# Patient Record
Sex: FEMALE | Race: ASIAN | NOT HISPANIC OR LATINO
[De-identification: names, ages, dates, MRNs, and addresses within clinical notes are randomized per-mention and may not be internally consistent; named-entity substitution may affect disease eponyms.]

---

## 2017-02-13 ENCOUNTER — APPOINTMENT (OUTPATIENT)
Dept: INTERNAL MEDICINE | Facility: CLINIC | Age: 22
End: 2017-02-13

## 2023-03-13 ENCOUNTER — TRANSCRIPTION ENCOUNTER (OUTPATIENT)
Age: 28
End: 2023-03-13

## 2023-03-13 ENCOUNTER — APPOINTMENT (OUTPATIENT)
Dept: ULTRASOUND IMAGING | Facility: CLINIC | Age: 28
End: 2023-03-13
Payer: COMMERCIAL

## 2023-03-13 PROCEDURE — 76830 TRANSVAGINAL US NON-OB: CPT

## 2023-03-13 PROCEDURE — 76856 US EXAM PELVIC COMPLETE: CPT

## 2023-03-23 ENCOUNTER — APPOINTMENT (OUTPATIENT)
Dept: OBGYN | Facility: CLINIC | Age: 28
End: 2023-03-23
Payer: COMMERCIAL

## 2023-03-23 VITALS
DIASTOLIC BLOOD PRESSURE: 68 MMHG | HEIGHT: 64 IN | WEIGHT: 160 LBS | BODY MASS INDEX: 27.31 KG/M2 | SYSTOLIC BLOOD PRESSURE: 118 MMHG

## 2023-03-23 PROCEDURE — 99203 OFFICE O/P NEW LOW 30 MIN: CPT

## 2023-03-25 NOTE — PLAN
[FreeTextEntry1] : 26yo G0 presenting for consultation regarding septate uterus prior to attempting conception. Reviewed imaging and results of TVUS with patient. Discussed that a 3D TVUS or MRI is preferable to evaluate presence and extent of uterine septum. Also discussed, in detail, cause/formation of septate uteri and many people are unaware of diagnosis until after multiple miscarriages. Reviewed surgical resection procedure. Discussed that after appropriate 3D imaging, surgical treatment may be deemed appropriate to to optimize pregnancy outcome. 3D u/s ordered with patient to return to office to review images.\par During this visit 40 minutes were spent face-to-face with greater than 50% of the time dedicated to counseling.\par

## 2023-03-25 NOTE — HISTORY OF PRESENT ILLNESS
[FreeTextEntry1] : 28yo G0 LMP: 3/10/23 presenting for consultation for septate uterus. Patient states that she was diagnosed years ago with a partial septate uterus. She recently is  and interested in pursuing pregnancy. She presented to her primary GYN who followed up with a TVUS confirming a subseptate uterus (results below). She presented to the office today for consultation regarding surgical resection. \par \par GYN Hx: Menarche at 9 consisting of painful menses. Day 1 described as debilitating when younger. This past year menses has become heavier where she has to change her pads hourly for for the first few days. Menses are also irregular ranging from qmonthly to every 3-4 months. PCOS- previously on POP up until 2yrs ago (d/c 2/2 weight gain).\par PMH: Migraine with aura, ITP- dx at age 11 Olson (+), TIMMY(+); follows annually with hematology. Protein S deficiency\par PSHx: Denies\par Meds: Denies\par Allg: Clams (emesis)\par Shx: Denies T/E/D\par \par PROCEDURE DATE:  03/13/2023  \par  INTERPRETATION:  CLINICAL INFORMATION: Subseptate uterus. Polycystic \par ovary disease. Heavy menses.\par \par LMP: 03/10/2023\par \par COMPARISON: None available.\par \par TECHNIQUE:\par Endovaginal and transabdominal pelvic sonogram.\par \par FINDINGS:\par Uterus: 7.4 cm x 3.7 cm x 5.5 cm. subseptate uterus. There is an anterior \par fibroid measuring 1.3 x 1.1 x 1.5 cm.\par Endometrium: Right horn measures 4 mm. Left horn measures 5 mm.\par \par Right ovary: 1.8 cm x 4.5 cm x 3.3 cm. Within normal limits. There are \par multiple follicles.\par Left ovary: 3.8 cm x 4.4 cm x 2.4 cm. Within normal limits. There are \par multiple follicles including a dominant follicle which measures 1.6 x 1.4 \par x 2.0 cm.\par \par Fluid: None.\par \par IMPRESSION:\par Subseptate uterus. Endometrial linings are normal in thickness. Small \par anterior uterine fibroid.\par Multiple follicles in both ovaries including a dominant follicle in the \par left ovary.\par \par \par

## 2023-04-03 ENCOUNTER — RESULT REVIEW (OUTPATIENT)
Age: 28
End: 2023-04-03

## 2023-04-03 ENCOUNTER — APPOINTMENT (OUTPATIENT)
Dept: ULTRASOUND IMAGING | Facility: CLINIC | Age: 28
End: 2023-04-03

## 2023-04-07 ENCOUNTER — APPOINTMENT (OUTPATIENT)
Dept: ULTRASOUND IMAGING | Facility: HOSPITAL | Age: 28
End: 2023-04-07
Payer: COMMERCIAL

## 2023-04-07 ENCOUNTER — OUTPATIENT (OUTPATIENT)
Dept: OUTPATIENT SERVICES | Facility: HOSPITAL | Age: 28
LOS: 1 days | End: 2023-04-07
Payer: COMMERCIAL

## 2023-04-07 ENCOUNTER — RESULT REVIEW (OUTPATIENT)
Age: 28
End: 2023-04-07

## 2023-04-07 DIAGNOSIS — Q51.9 CONGENITAL MALFORMATION OF UTERUS AND CERVIX, UNSPECIFIED: ICD-10-CM

## 2023-04-07 PROCEDURE — 76377 3D RENDER W/INTRP POSTPROCES: CPT

## 2023-04-07 PROCEDURE — 76830 TRANSVAGINAL US NON-OB: CPT

## 2023-04-07 PROCEDURE — 76830 TRANSVAGINAL US NON-OB: CPT | Mod: 26

## 2023-04-07 PROCEDURE — 76377 3D RENDER W/INTRP POSTPROCES: CPT | Mod: 26

## 2023-06-30 ENCOUNTER — APPOINTMENT (OUTPATIENT)
Dept: ULTRASOUND IMAGING | Facility: CLINIC | Age: 28
End: 2023-06-30
Payer: COMMERCIAL

## 2023-06-30 PROCEDURE — 76817 TRANSVAGINAL US OBSTETRIC: CPT

## 2023-06-30 PROCEDURE — 76801 OB US < 14 WKS SINGLE FETUS: CPT

## 2023-07-07 ENCOUNTER — APPOINTMENT (OUTPATIENT)
Dept: OBGYN | Facility: CLINIC | Age: 28
End: 2023-07-07
Payer: COMMERCIAL

## 2023-07-07 VITALS — DIASTOLIC BLOOD PRESSURE: 70 MMHG | OXYGEN SATURATION: 98 % | SYSTOLIC BLOOD PRESSURE: 102 MMHG | HEART RATE: 96 BPM

## 2023-07-07 VITALS
SYSTOLIC BLOOD PRESSURE: 106 MMHG | HEIGHT: 64 IN | DIASTOLIC BLOOD PRESSURE: 78 MMHG | WEIGHT: 160 LBS | TEMPERATURE: 98.4 F | OXYGEN SATURATION: 99 % | HEART RATE: 81 BPM | BODY MASS INDEX: 27.31 KG/M2

## 2023-07-07 DIAGNOSIS — O02.1 MISSED ABORTION: ICD-10-CM

## 2023-07-07 DIAGNOSIS — Q51.9 CONGENITAL MALFORMATION OF UTERUS AND CERVIX, UNSPECIFIED: ICD-10-CM

## 2023-07-07 PROCEDURE — 59820 CARE OF MISCARRIAGE: CPT

## 2023-07-07 PROCEDURE — 76998 US GUIDE INTRAOP: CPT

## 2023-07-07 PROCEDURE — 99204 OFFICE O/P NEW MOD 45 MIN: CPT | Mod: 25

## 2023-07-07 RX ORDER — IBUPROFEN 600 MG/1
600 TABLET, FILM COATED ORAL 4 TIMES DAILY
Qty: 60 | Refills: 0 | Status: ACTIVE | COMMUNITY
Start: 2023-07-07 | End: 1900-01-01

## 2023-07-07 RX ORDER — DOXYCYCLINE HYCLATE 100 MG/1
100 TABLET ORAL
Qty: 2 | Refills: 0 | Status: ACTIVE | COMMUNITY
Start: 2023-07-07 | End: 1900-01-01

## 2023-07-07 NOTE — PHYSICAL EXAM
[Chaperone Present] : A chaperone was present in the examining room during all aspects of the physical examination [FreeTextEntry1] : Dylan Fernandez LPN
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
## 2023-07-13 LAB — CORE LAB BIOPSY: NORMAL

## 2023-07-21 ENCOUNTER — APPOINTMENT (OUTPATIENT)
Dept: OBGYN | Facility: CLINIC | Age: 28
End: 2023-07-21

## 2023-08-02 ENCOUNTER — APPOINTMENT (OUTPATIENT)
Dept: ORTHOPEDIC SURGERY | Facility: CLINIC | Age: 28
End: 2023-08-02
Payer: COMMERCIAL

## 2023-08-02 VITALS — WEIGHT: 160 LBS | BODY MASS INDEX: 27.31 KG/M2 | HEIGHT: 64 IN

## 2023-08-02 DIAGNOSIS — M65.4 RADIAL STYLOID TENOSYNOVITIS [DE QUERVAIN]: ICD-10-CM

## 2023-08-02 PROCEDURE — 73110 X-RAY EXAM OF WRIST: CPT | Mod: RT

## 2023-08-02 PROCEDURE — 20550 NJX 1 TENDON SHEATH/LIGAMENT: CPT

## 2023-08-02 PROCEDURE — 99203 OFFICE O/P NEW LOW 30 MIN: CPT | Mod: 25

## 2023-08-02 NOTE — IMAGING
[de-identified] : Right wrist No ecchymosis, swelling or wounds Normal muscle tone/ bulk TTP along first dorsal compartment Full symmetric wrist ROM Full painless finger ROM +AIN/ PIN/ UIlnar n SILT throughout fingers wwp + Finkelstein's

## 2023-08-02 NOTE — PROCEDURE
[FreeTextEntry1] : Corticosteroid injection [FreeTextEntry2] : Right de Quervain's tenosynovitis [FreeTextEntry3] : The risks and benefits of steroid injections were discussed. Verbal consent was obtained,  The site was prepped in a sterile fashion with alcohol A combination of 40 mg (1cc) of Kenalog and 2cc 1% xylocaine were injected under sterile conditions at the level of the right first dorsal compartment Patient tolerated the procedure without complication and was counseled on post injection expectations.

## 2023-08-02 NOTE — HISTORY OF PRESENT ILLNESS
[de-identified] : 08/02/2023  LATIA 28 year F is here for Location: Right Wrist  Complaint: Patient reports radial sided wrist pain beginning roughly 1 month ago.  She does have a history of similar complaints which were treated successfully with a CSI roughly 2 years ago.  At that time she noted bilateral complaints however does not notice left-sided pain today.  She notes pain associated with specific activities and motions.  No numbness or tingling. Onset of symptoms: 1 month Prior treatments: CSI, 2 years ago Hand dominance: Right  Occupation: pharmacist

## 2023-08-02 NOTE — DISCUSSION/SUMMARY
[de-identified] : - discussed etiology/ pathophysiology of her complaints today in layman's terms - reviewed treatment options, conservative and operative - reviewed r/b/a to these - activity modifications - NSAIDs prn pain - f/u in 2 weeks for repeat clinical exam

## 2023-08-03 ENCOUNTER — APPOINTMENT (OUTPATIENT)
Dept: OBGYN | Facility: CLINIC | Age: 28
End: 2023-08-03
Payer: COMMERCIAL

## 2023-08-03 PROCEDURE — ZZZZZ: CPT

## 2023-08-04 ENCOUNTER — TRANSCRIPTION ENCOUNTER (OUTPATIENT)
Age: 28
End: 2023-08-04

## 2023-08-07 LAB — HCG SERPL-MCNC: 2 MIU/ML

## 2023-08-11 ENCOUNTER — APPOINTMENT (OUTPATIENT)
Dept: HUMAN REPRODUCTION | Facility: CLINIC | Age: 28
End: 2023-08-11

## 2023-08-31 ENCOUNTER — NON-APPOINTMENT (OUTPATIENT)
Age: 28
End: 2023-08-31

## 2023-08-31 LAB — HCG SERPL-MCNC: <1 MIU/ML

## 2023-10-12 ENCOUNTER — APPOINTMENT (OUTPATIENT)
Dept: INTERNAL MEDICINE | Facility: CLINIC | Age: 28
End: 2023-10-12

## 2023-11-10 ENCOUNTER — NON-APPOINTMENT (OUTPATIENT)
Age: 28
End: 2023-11-10

## 2023-11-10 ENCOUNTER — APPOINTMENT (OUTPATIENT)
Dept: INTERNAL MEDICINE | Facility: CLINIC | Age: 28
End: 2023-11-10
Payer: COMMERCIAL

## 2023-11-10 VITALS
OXYGEN SATURATION: 98 % | BODY MASS INDEX: 29.38 KG/M2 | HEIGHT: 63.78 IN | WEIGHT: 170 LBS | SYSTOLIC BLOOD PRESSURE: 117 MMHG | HEART RATE: 78 BPM | DIASTOLIC BLOOD PRESSURE: 78 MMHG | TEMPERATURE: 98.1 F

## 2023-11-10 DIAGNOSIS — Z23 ENCOUNTER FOR IMMUNIZATION: ICD-10-CM

## 2023-11-10 DIAGNOSIS — Z83.3 FAMILY HISTORY OF DIABETES MELLITUS: ICD-10-CM

## 2023-11-10 DIAGNOSIS — Z01.84 ENCOUNTER FOR ANTIBODY RESPONSE EXAMINATION: ICD-10-CM

## 2023-11-10 DIAGNOSIS — Z00.00 ENCOUNTER FOR GENERAL ADULT MEDICAL EXAMINATION W/OUT ABNORMAL FINDINGS: ICD-10-CM

## 2023-11-10 DIAGNOSIS — Z82.49 FAMILY HISTORY OF ISCHEMIC HEART DISEASE AND OTHER DISEASES OF THE CIRCULATORY SYSTEM: ICD-10-CM

## 2023-11-10 DIAGNOSIS — R00.2 PALPITATIONS: ICD-10-CM

## 2023-11-10 PROCEDURE — G0444 DEPRESSION SCREEN ANNUAL: CPT | Mod: 59

## 2023-11-10 PROCEDURE — 90471 IMMUNIZATION ADMIN: CPT

## 2023-11-10 PROCEDURE — 99203 OFFICE O/P NEW LOW 30 MIN: CPT | Mod: 25

## 2023-11-10 PROCEDURE — 90715 TDAP VACCINE 7 YRS/> IM: CPT

## 2023-11-10 PROCEDURE — 99385 PREV VISIT NEW AGE 18-39: CPT | Mod: 25

## 2023-11-10 PROCEDURE — 93000 ELECTROCARDIOGRAM COMPLETE: CPT | Mod: 59

## 2023-11-10 PROCEDURE — 36415 COLL VENOUS BLD VENIPUNCTURE: CPT

## 2023-11-11 PROBLEM — Z23 ENCOUNTER FOR IMMUNIZATION: Status: ACTIVE | Noted: 2023-11-10 | Resolved: 2023-11-24

## 2023-11-11 PROBLEM — R00.2 PALPITATIONS: Status: ACTIVE | Noted: 2023-11-11

## 2023-11-11 PROBLEM — Z01.84 ANTIBODY RESPONSE EXAMINATION: Status: ACTIVE | Noted: 2023-11-10

## 2023-11-12 LAB
25(OH)D3 SERPL-MCNC: 30.9 NG/ML
ALBUMIN SERPL ELPH-MCNC: 4.5 G/DL
ALP BLD-CCNC: 71 U/L
ALT SERPL-CCNC: 18 U/L
ANION GAP SERPL CALC-SCNC: 14 MMOL/L
APPEARANCE: CLEAR
AST SERPL-CCNC: 15 U/L
BASOPHILS # BLD AUTO: 0.03 K/UL
BASOPHILS NFR BLD AUTO: 0.6 %
BILIRUB SERPL-MCNC: 0.6 MG/DL
BILIRUBIN URINE: NEGATIVE
BLOOD URINE: NEGATIVE
BUN SERPL-MCNC: 15 MG/DL
CALCIUM SERPL-MCNC: 9.4 MG/DL
CHLORIDE SERPL-SCNC: 104 MMOL/L
CO2 SERPL-SCNC: 21 MMOL/L
COLOR: YELLOW
CREAT SERPL-MCNC: 0.78 MG/DL
EGFR: 106 ML/MIN/1.73M2
EOSINOPHIL # BLD AUTO: 0.15 K/UL
EOSINOPHIL NFR BLD AUTO: 3.1 %
ESTIMATED AVERAGE GLUCOSE: 108 MG/DL
GLUCOSE QUALITATIVE U: NEGATIVE MG/DL
GLUCOSE SERPL-MCNC: 101 MG/DL
HBA1C MFR BLD HPLC: 5.4 %
HCT VFR BLD CALC: 40.3 %
HGB BLD-MCNC: 13.4 G/DL
IMM GRANULOCYTES NFR BLD AUTO: 0.4 %
KETONES URINE: NEGATIVE MG/DL
LEUKOCYTE ESTERASE URINE: NEGATIVE
LYMPHOCYTES # BLD AUTO: 1.35 K/UL
LYMPHOCYTES NFR BLD AUTO: 28.2 %
MAN DIFF?: NORMAL
MCHC RBC-ENTMCNC: 30.5 PG
MCHC RBC-ENTMCNC: 33.3 GM/DL
MCV RBC AUTO: 91.6 FL
MEV IGG FLD QL IA: >300 AU/ML
MEV IGG+IGM SER-IMP: POSITIVE
MONOCYTES # BLD AUTO: 0.4 K/UL
MONOCYTES NFR BLD AUTO: 8.4 %
MUV AB SER-ACNC: POSITIVE
MUV IGG SER QL IA: 58.1 AU/ML
NEUTROPHILS # BLD AUTO: 2.83 K/UL
NEUTROPHILS NFR BLD AUTO: 59.3 %
NITRITE URINE: NEGATIVE
PH URINE: 5.5
PLATELET # BLD AUTO: 247 K/UL
POTASSIUM SERPL-SCNC: 4.2 MMOL/L
PROT SERPL-MCNC: 7.5 G/DL
PROTEIN URINE: NEGATIVE MG/DL
RBC # BLD: 4.4 M/UL
RBC # FLD: 12.4 %
RHEUMATOID FACT SER QL: <10 IU/ML
RUBV IGG FLD-ACNC: 2.8 INDEX
RUBV IGG SER-IMP: POSITIVE
SODIUM SERPL-SCNC: 139 MMOL/L
SPECIFIC GRAVITY URINE: 1.02
TSH SERPL-ACNC: 1.62 UIU/ML
UROBILINOGEN URINE: 0.2 MG/DL
WBC # FLD AUTO: 4.78 K/UL

## 2023-11-13 ENCOUNTER — TRANSCRIPTION ENCOUNTER (OUTPATIENT)
Age: 28
End: 2023-11-13

## 2023-11-13 LAB
CHOLEST SERPL-MCNC: 120 MG/DL
HDLC SERPL-MCNC: 48 MG/DL
LDLC SERPL CALC-MCNC: 62 MG/DL
NONHDLC SERPL-MCNC: 73 MG/DL
TRIGL SERPL-MCNC: 43 MG/DL

## 2023-11-14 ENCOUNTER — TRANSCRIPTION ENCOUNTER (OUTPATIENT)
Age: 28
End: 2023-11-14

## 2023-11-14 LAB
ANA PAT FLD IF-IMP: ABNORMAL
ANA SER IF-ACNC: ABNORMAL
CCP AB SER IA-ACNC: <8 UNITS
RF+CCP IGG SER-IMP: NEGATIVE

## 2024-03-08 ENCOUNTER — ASOB RESULT (OUTPATIENT)
Age: 29
End: 2024-03-08

## 2024-03-08 ENCOUNTER — APPOINTMENT (OUTPATIENT)
Dept: ANTEPARTUM | Facility: CLINIC | Age: 29
End: 2024-03-08
Payer: COMMERCIAL

## 2024-03-08 PROCEDURE — 76813 OB US NUCHAL MEAS 1 GEST: CPT | Mod: 59

## 2024-03-08 PROCEDURE — 76801 OB US < 14 WKS SINGLE FETUS: CPT

## 2024-03-11 DIAGNOSIS — O09.90 SUPERVISION OF HIGH RISK PREGNANCY, UNSPECIFIED, UNSPECIFIED TRIMESTER: ICD-10-CM

## 2024-03-14 ENCOUNTER — APPOINTMENT (OUTPATIENT)
Dept: ANTEPARTUM | Facility: CLINIC | Age: 29
End: 2024-03-14
Payer: COMMERCIAL

## 2024-03-14 ENCOUNTER — ASOB RESULT (OUTPATIENT)
Age: 29
End: 2024-03-14

## 2024-03-14 PROCEDURE — 99202 OFFICE O/P NEW SF 15 MIN: CPT | Mod: 25

## 2024-03-14 PROCEDURE — 76817 TRANSVAGINAL US OBSTETRIC: CPT | Mod: 59

## 2024-03-14 PROCEDURE — 76801 OB US < 14 WKS SINGLE FETUS: CPT

## 2024-03-15 ENCOUNTER — OUTPATIENT (OUTPATIENT)
Dept: OUTPATIENT SERVICES | Facility: HOSPITAL | Age: 29
LOS: 1 days | End: 2024-03-15
Payer: COMMERCIAL

## 2024-03-15 ENCOUNTER — LABORATORY RESULT (OUTPATIENT)
Age: 29
End: 2024-03-15

## 2024-03-15 DIAGNOSIS — Z34.00 ENCOUNTER FOR SUPERVISION OF NORMAL FIRST PREGNANCY, UNSPECIFIED TRIMESTER: ICD-10-CM

## 2024-03-15 PROCEDURE — 86850 RBC ANTIBODY SCREEN: CPT

## 2024-03-15 PROCEDURE — 86870 RBC ANTIBODY IDENTIFICATION: CPT

## 2024-03-15 PROCEDURE — 86880 COOMBS TEST DIRECT: CPT

## 2024-03-15 PROCEDURE — 86901 BLOOD TYPING SEROLOGIC RH(D): CPT

## 2024-03-15 PROCEDURE — 86900 BLOOD TYPING SEROLOGIC ABO: CPT

## 2024-03-15 PROCEDURE — 86886 COOMBS TEST INDIRECT TITER: CPT

## 2024-03-16 PROCEDURE — 86077 PHYS BLOOD BANK SERV XMATCH: CPT

## 2024-03-27 ENCOUNTER — ASOB RESULT (OUTPATIENT)
Age: 29
End: 2024-03-27

## 2024-03-27 ENCOUNTER — APPOINTMENT (OUTPATIENT)
Dept: MATERNAL FETAL MEDICINE | Facility: CLINIC | Age: 29
End: 2024-03-27
Payer: COMMERCIAL

## 2024-03-27 PROCEDURE — 99214 OFFICE O/P EST MOD 30 MIN: CPT | Mod: 95

## 2024-04-01 ENCOUNTER — LABORATORY RESULT (OUTPATIENT)
Age: 29
End: 2024-04-01

## 2024-04-01 ENCOUNTER — ASOB RESULT (OUTPATIENT)
Age: 29
End: 2024-04-01

## 2024-04-01 ENCOUNTER — APPOINTMENT (OUTPATIENT)
Dept: ANTEPARTUM | Facility: CLINIC | Age: 29
End: 2024-04-01
Payer: COMMERCIAL

## 2024-04-01 PROCEDURE — 76805 OB US >/= 14 WKS SNGL FETUS: CPT

## 2024-04-01 PROCEDURE — 36415 COLL VENOUS BLD VENIPUNCTURE: CPT

## 2024-04-01 PROCEDURE — 76817 TRANSVAGINAL US OBSTETRIC: CPT

## 2024-04-05 ENCOUNTER — APPOINTMENT (OUTPATIENT)
Dept: RHEUMATOLOGY | Facility: CLINIC | Age: 29
End: 2024-04-05

## 2024-04-15 ENCOUNTER — ASOB RESULT (OUTPATIENT)
Age: 29
End: 2024-04-15

## 2024-04-15 ENCOUNTER — APPOINTMENT (OUTPATIENT)
Dept: ANTEPARTUM | Facility: CLINIC | Age: 29
End: 2024-04-15
Payer: COMMERCIAL

## 2024-04-15 ENCOUNTER — LABORATORY RESULT (OUTPATIENT)
Age: 29
End: 2024-04-15

## 2024-04-15 PROCEDURE — 76816 OB US FOLLOW-UP PER FETUS: CPT

## 2024-04-15 PROCEDURE — 99213 OFFICE O/P EST LOW 20 MIN: CPT | Mod: 25

## 2024-04-15 PROCEDURE — 76817 TRANSVAGINAL US OBSTETRIC: CPT

## 2024-04-18 ENCOUNTER — APPOINTMENT (OUTPATIENT)
Dept: RHEUMATOLOGY | Facility: CLINIC | Age: 29
End: 2024-04-18
Payer: COMMERCIAL

## 2024-04-18 VITALS
BODY MASS INDEX: 29.71 KG/M2 | SYSTOLIC BLOOD PRESSURE: 114 MMHG | OXYGEN SATURATION: 98 % | WEIGHT: 174 LBS | DIASTOLIC BLOOD PRESSURE: 75 MMHG | RESPIRATION RATE: 16 BRPM | HEIGHT: 64 IN | HEART RATE: 102 BPM

## 2024-04-18 DIAGNOSIS — M25.50 PAIN IN UNSPECIFIED JOINT: ICD-10-CM

## 2024-04-18 DIAGNOSIS — R76.8 OTHER SPECIFIED ABNORMAL IMMUNOLOGICAL FINDINGS IN SERUM: ICD-10-CM

## 2024-04-18 LAB
ALBUMIN SERPL ELPH-MCNC: 4 G/DL
ALP BLD-CCNC: 55 U/L
ALT SERPL-CCNC: 19 U/L
ANION GAP SERPL CALC-SCNC: 11 MMOL/L
AST SERPL-CCNC: 17 U/L
BASOPHILS # BLD AUTO: 0.02 K/UL
BASOPHILS NFR BLD AUTO: 0.3 %
BILIRUB SERPL-MCNC: 0.6 MG/DL
BUN SERPL-MCNC: 8 MG/DL
CALCIUM SERPL-MCNC: 9 MG/DL
CHLORIDE SERPL-SCNC: 107 MMOL/L
CK SERPL-CCNC: 41 U/L
CO2 SERPL-SCNC: 21 MMOL/L
CREAT SERPL-MCNC: 0.51 MG/DL
EGFR: 130 ML/MIN/1.73M2
EOSINOPHIL # BLD AUTO: 0.06 K/UL
EOSINOPHIL NFR BLD AUTO: 0.8 %
HCT VFR BLD CALC: 35.4 %
HGB BLD-MCNC: 11.7 G/DL
IMM GRANULOCYTES NFR BLD AUTO: 1.1 %
LYMPHOCYTES # BLD AUTO: 1.17 K/UL
LYMPHOCYTES NFR BLD AUTO: 14.9 %
MAN DIFF?: NORMAL
MCHC RBC-ENTMCNC: 30.5 PG
MCHC RBC-ENTMCNC: 33.1 GM/DL
MCV RBC AUTO: 92.4 FL
MONOCYTES # BLD AUTO: 0.5 K/UL
MONOCYTES NFR BLD AUTO: 6.4 %
NEUTROPHILS # BLD AUTO: 6.02 K/UL
NEUTROPHILS NFR BLD AUTO: 76.5 %
PLATELET # BLD AUTO: 199 K/UL
POTASSIUM SERPL-SCNC: 3.7 MMOL/L
PROT SERPL-MCNC: 6.5 G/DL
RBC # BLD: 3.83 M/UL
RBC # FLD: 13.8 %
RHEUMATOID FACT SER QL: <10 IU/ML
SODIUM SERPL-SCNC: 139 MMOL/L
WBC # FLD AUTO: 7.86 K/UL

## 2024-04-18 PROCEDURE — 99204 OFFICE O/P NEW MOD 45 MIN: CPT

## 2024-04-18 NOTE — ASSESSMENT
[FreeTextEntry1] : 1.  TIMMY positive: no symptoms to suggest an inflammatory disorder, although she did have ITP during childhood 2.  Childhood ITP: resolved after treatment with WinRho 3.  Wrist pain: s/p injections in 2023 and 2024 (was this a wrist joint problem or perhaps deQuervain's?) 4.  OB history:  currently 18 weeks pregnant with bleeding as a complication.  Plan Lab tests Reviewed internal and/or external records of other providers Contact me

## 2024-04-18 NOTE — HISTORY OF PRESENT ILLNESS
[FreeTextEntry1] : 1.  TIMMY positive: no symptoms to suggest an inflammatory disorder, although she did have ITP during childhood 2.  Childhood ITP: resolved after treatment with WinRho 3.  Wrist pain: s/p injections in 2023 and 2024 (was this a wrist joint problem or perhaps deQuervain's?) 4.  OB history:  currently 18 weeks pregnant with bleeding as a complication.  Meds vitamins

## 2024-04-18 NOTE — REASON FOR VISIT
[Consultation] : a consultation visit [FreeTextEntry1] : ITMMY positive;  Katie Adorno MD  [address], [place] [address];  Lily Suarez MD [address], [place] [address]

## 2024-04-18 NOTE — PHYSICAL EXAM
[General Appearance - Alert] : alert [General Appearance - In No Acute Distress] : in no acute distress [General Appearance - Well Nourished] : well nourished [General Appearance - Well-Appearing] : healthy appearing [Sclera] : the sclera and conjunctiva were normal [Outer Ear] : the ears and nose were normal in appearance [Neck Appearance] : the appearance of the neck was normal [Auscultation Breath Sounds / Voice Sounds] : lungs were clear to auscultation bilaterally [Heart Rate And Rhythm] : heart rate was normal and rhythm regular [Heart Sounds] : normal S1 and S2 [Heart Sounds Gallop] : no gallops [Murmurs] : no murmurs [Edema] : there was no peripheral edema [Abdomen Soft] : soft [Cervical Lymph Nodes Enlarged Posterior Bilaterally] : posterior cervical [Cervical Lymph Nodes Enlarged Anterior Bilaterally] : anterior cervical [Supraclavicular Lymph Nodes Enlarged Bilaterally] : supraclavicular [No CVA Tenderness] : no ~M costovertebral angle tenderness [Abnormal Walk] : normal gait [Nail Clubbing] : no clubbing  or cyanosis of the fingernails [Musculoskeletal - Swelling] : no joint swelling seen [Motor Tone] : muscle strength and tone were normal [Skin Color & Pigmentation] : normal skin color and pigmentation [Skin Turgor] : normal skin turgor [] : no rash [Sensation] : the sensory exam was normal to light touch and pinprick [Oriented To Time, Place, And Person] : oriented to person, place, and time [Impaired Insight] : insight and judgment were intact [Affect] : the affect was normal

## 2024-04-19 LAB
C3 SERPL-MCNC: 135 MG/DL
C4 SERPL-MCNC: 27 MG/DL
DSDNA AB SER-ACNC: 1 IU/ML
ENA RNP AB SER IA-ACNC: 0.2 AL
ENA SM AB SER IA-ACNC: <0.2 AL

## 2024-04-20 LAB
CCP AB SER IA-ACNC: <8 UNITS
RF+CCP IGG SER-IMP: NEGATIVE
THYROGLOB AB SERPL-ACNC: <20 IU/ML
THYROPEROXIDASE AB SERPL IA-ACNC: <10 IU/ML

## 2024-04-21 LAB — ANA SER IF-ACNC: NEGATIVE

## 2024-04-23 LAB
A PHAGOCYTOPH IGG TITR SER IF: NORMAL
B BURGDOR AB SER QL IA: 0.1 IV
B MICROTI IGG TITR SER: NORMAL
E CHAFFEENSIS IGG TITR SER IF: NORMAL

## 2024-04-24 LAB
PS-PROTHROM CMPLX IGG SERPL IA-ACNC: <10 UNITS
PS-PROTHROM CMPLX IGM SERPL IA-ACNC: 14 UNITS

## 2024-05-03 ENCOUNTER — APPOINTMENT (OUTPATIENT)
Dept: ANTEPARTUM | Facility: CLINIC | Age: 29
End: 2024-05-03
Payer: COMMERCIAL

## 2024-05-03 ENCOUNTER — ASOB RESULT (OUTPATIENT)
Age: 29
End: 2024-05-03

## 2024-05-03 LAB
ALBUMIN SERPL ELPH-MCNC: 4 G/DL
ALP BLD-CCNC: 66 U/L
ALT SERPL-CCNC: 20 U/L
ANION GAP SERPL CALC-SCNC: 12 MMOL/L
AST SERPL-CCNC: 19 U/L
BILIRUB SERPL-MCNC: 0.6 MG/DL
BUN SERPL-MCNC: 7 MG/DL
CALCIUM SERPL-MCNC: 9.1 MG/DL
CHLORIDE SERPL-SCNC: 106 MMOL/L
CO2 SERPL-SCNC: 21 MMOL/L
CREAT SERPL-MCNC: 0.53 MG/DL
EGFR: 128 ML/MIN/1.73M2
GLUCOSE SERPL-MCNC: 76 MG/DL
POTASSIUM SERPL-SCNC: 4.3 MMOL/L
PROT SERPL-MCNC: 6.8 G/DL
SODIUM SERPL-SCNC: 139 MMOL/L

## 2024-05-03 PROCEDURE — 76811 OB US DETAILED SNGL FETUS: CPT

## 2024-05-03 PROCEDURE — 76817 TRANSVAGINAL US OBSTETRIC: CPT | Mod: 59

## 2024-05-13 ENCOUNTER — ASOB RESULT (OUTPATIENT)
Age: 29
End: 2024-05-13

## 2024-05-13 ENCOUNTER — APPOINTMENT (OUTPATIENT)
Dept: ANTEPARTUM | Facility: CLINIC | Age: 29
End: 2024-05-13
Payer: COMMERCIAL

## 2024-05-13 PROCEDURE — 76817 TRANSVAGINAL US OBSTETRIC: CPT

## 2024-05-29 ENCOUNTER — APPOINTMENT (OUTPATIENT)
Dept: ANTEPARTUM | Facility: CLINIC | Age: 29
End: 2024-05-29
Payer: COMMERCIAL

## 2024-05-29 ENCOUNTER — ASOB RESULT (OUTPATIENT)
Age: 29
End: 2024-05-29

## 2024-05-29 PROCEDURE — 76816 OB US FOLLOW-UP PER FETUS: CPT

## 2024-06-24 ENCOUNTER — APPOINTMENT (OUTPATIENT)
Dept: ANTEPARTUM | Facility: CLINIC | Age: 29
End: 2024-06-24

## 2024-06-24 ENCOUNTER — ASOB RESULT (OUTPATIENT)
Age: 29
End: 2024-06-24

## 2024-06-24 PROCEDURE — 76816 OB US FOLLOW-UP PER FETUS: CPT

## 2024-06-28 ENCOUNTER — ASOB RESULT (OUTPATIENT)
Age: 29
End: 2024-06-28

## 2024-06-28 ENCOUNTER — APPOINTMENT (OUTPATIENT)
Dept: MATERNAL FETAL MEDICINE | Facility: CLINIC | Age: 29
End: 2024-06-28
Payer: COMMERCIAL

## 2024-06-28 DIAGNOSIS — O99.810 ABNORMAL GLUCOSE COMPLICATING PREGNANCY: ICD-10-CM

## 2024-06-28 PROCEDURE — G0109 DIAB MANAGE TRN IND/GROUP: CPT | Mod: 95

## 2024-07-10 ENCOUNTER — NON-APPOINTMENT (OUTPATIENT)
Age: 29
End: 2024-07-10

## 2024-07-12 ENCOUNTER — ASOB RESULT (OUTPATIENT)
Age: 29
End: 2024-07-12

## 2024-07-12 ENCOUNTER — APPOINTMENT (OUTPATIENT)
Dept: MATERNAL FETAL MEDICINE | Facility: CLINIC | Age: 29
End: 2024-07-12
Payer: COMMERCIAL

## 2024-07-12 PROCEDURE — G0108 DIAB MANAGE TRN  PER INDIV: CPT | Mod: 95

## 2024-07-22 ENCOUNTER — ASOB RESULT (OUTPATIENT)
Age: 29
End: 2024-07-22

## 2024-07-22 ENCOUNTER — APPOINTMENT (OUTPATIENT)
Dept: ANTEPARTUM | Facility: CLINIC | Age: 29
End: 2024-07-22
Payer: COMMERCIAL

## 2024-07-22 PROCEDURE — 76816 OB US FOLLOW-UP PER FETUS: CPT

## 2024-07-29 ENCOUNTER — APPOINTMENT (OUTPATIENT)
Dept: MATERNAL FETAL MEDICINE | Facility: CLINIC | Age: 29
End: 2024-07-29
Payer: COMMERCIAL

## 2024-07-29 ENCOUNTER — ASOB RESULT (OUTPATIENT)
Age: 29
End: 2024-07-29

## 2024-07-29 PROCEDURE — G0108 DIAB MANAGE TRN  PER INDIV: CPT | Mod: 95

## 2024-08-01 ENCOUNTER — OUTPATIENT (OUTPATIENT)
Dept: OUTPATIENT SERVICES | Facility: HOSPITAL | Age: 29
LOS: 1 days | End: 2024-08-01
Payer: COMMERCIAL

## 2024-08-01 VITALS — OXYGEN SATURATION: 97 % | HEART RATE: 98 BPM

## 2024-08-01 VITALS — TEMPERATURE: 99 F

## 2024-08-01 DIAGNOSIS — Z98.890 OTHER SPECIFIED POSTPROCEDURAL STATES: Chronic | ICD-10-CM

## 2024-08-01 DIAGNOSIS — O26.899 OTHER SPECIFIED PREGNANCY RELATED CONDITIONS, UNSPECIFIED TRIMESTER: ICD-10-CM

## 2024-08-01 PROCEDURE — G0463: CPT

## 2024-08-01 NOTE — OB RN TRIAGE NOTE - NSDCTEMPFAHRENHEIT_OBGYN_A_OB_CAL
- Patient with debilitating b/l UE and LE tremor, followed by neurology outpatient. Suspected medication-induced PD, however tremor worsened after weaning off abilify. Was considering sinemet outpatient.   - Patient also with reported incidents of episodic confusion/hallucinations. MRI without infarcts.    Plan  - Neurology consulted, apprec recs  - Ammonia level (wnl); thiamine level (ordered 5/8, results pending)  - Patient with prior low levels of thiamine, not taking supplements at home  - Start IV thiamine 500mg TID x 5 days (end date: 5/13), then transition to po pending result of thiamine level  - No sinamet at this time, as has potential to worsen hallucinations and current abdominal pain  - 24 hr EEG complete without demonstration of epileptiform activity  - delirium precautions; minimize sedating medications    98.8
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
## 2024-08-01 NOTE — OB PROVIDER TRIAGE NOTE - NSHPPHYSICALEXAM_GEN_ALL_CORE
Gen: NAD  Cards: Extremities well perfused  Pulm: Breathing comfortably on RA  Abd: soft, non-tender, gravid  Ext: warm, well perfused, non-edematous     NST: 135/moderate variability/+Accels/-decels

## 2024-08-03 ENCOUNTER — OUTPATIENT (OUTPATIENT)
Dept: OUTPATIENT SERVICES | Facility: HOSPITAL | Age: 29
LOS: 1 days | End: 2024-08-03
Payer: COMMERCIAL

## 2024-08-03 VITALS
HEART RATE: 96 BPM | RESPIRATION RATE: 18 BRPM | TEMPERATURE: 99 F | DIASTOLIC BLOOD PRESSURE: 79 MMHG | OXYGEN SATURATION: 95 % | SYSTOLIC BLOOD PRESSURE: 117 MMHG

## 2024-08-03 VITALS — DIASTOLIC BLOOD PRESSURE: 73 MMHG | HEART RATE: 91 BPM | SYSTOLIC BLOOD PRESSURE: 139 MMHG

## 2024-08-03 DIAGNOSIS — O26.899 OTHER SPECIFIED PREGNANCY RELATED CONDITIONS, UNSPECIFIED TRIMESTER: ICD-10-CM

## 2024-08-03 DIAGNOSIS — Z98.890 OTHER SPECIFIED POSTPROCEDURAL STATES: Chronic | ICD-10-CM

## 2024-08-03 PROBLEM — Z78.9 OTHER SPECIFIED HEALTH STATUS: Chronic | Status: ACTIVE | Noted: 2024-08-01

## 2024-08-03 LAB
APPEARANCE UR: CLEAR — SIGNIFICANT CHANGE UP
BILIRUB UR-MCNC: NEGATIVE — SIGNIFICANT CHANGE UP
COLOR SPEC: YELLOW — SIGNIFICANT CHANGE UP
DIFF PNL FLD: NEGATIVE — SIGNIFICANT CHANGE UP
GLUCOSE UR QL: NEGATIVE MG/DL — SIGNIFICANT CHANGE UP
KETONES UR-MCNC: NEGATIVE MG/DL — SIGNIFICANT CHANGE UP
LEUKOCYTE ESTERASE UR-ACNC: NEGATIVE — SIGNIFICANT CHANGE UP
NITRITE UR-MCNC: NEGATIVE — SIGNIFICANT CHANGE UP
PH UR: 6 — SIGNIFICANT CHANGE UP (ref 5–8)
PROT UR-MCNC: SIGNIFICANT CHANGE UP MG/DL
SP GR SPEC: 1.02 — SIGNIFICANT CHANGE UP (ref 1–1.03)
UROBILINOGEN FLD QL: 1 MG/DL — SIGNIFICANT CHANGE UP (ref 0.2–1)

## 2024-08-03 PROCEDURE — 87086 URINE CULTURE/COLONY COUNT: CPT

## 2024-08-03 PROCEDURE — G0463: CPT

## 2024-08-03 PROCEDURE — 81003 URINALYSIS AUTO W/O SCOPE: CPT

## 2024-08-03 NOTE — OB PROVIDER TRIAGE NOTE - NS_DISPOSITION_OBGYN_ALL_OB
Discharge No Repair - Repaired With Adjacent Surgical Defect Text (Leave Blank If You Do Not Want): After obtaining clear surgical margins the defect was repaired concurrently with another surgical defect which was in close approximation.

## 2024-08-03 NOTE — OB PROVIDER TRIAGE NOTE - NS_LMP_OBGYN_ALL_OB_DT
[FreeTextEntry1] : Her physical exam is normal except for obesity. EKG is normal.  There is no history to suggest any underlying coronary disease. She exercises regularly without having any ischemic symptoms.
13-Dec-2023

## 2024-08-03 NOTE — OB RN TRIAGE NOTE - FALL HARM RISK - UNIVERSAL INTERVENTIONS
Bed in lowest position, wheels locked, appropriate side rails in place/Call bell, personal items and telephone in reach/Instruct patient to call for assistance before getting out of bed or chair/Non-slip footwear when patient is out of bed/[place] to call system/Physically safe environment - no spills, clutter or unnecessary equipment/Purposeful Proactive Rounding/Room/bathroom lighting operational, light cord in reach

## 2024-08-03 NOTE — OB PROVIDER TRIAGE NOTE - NSHPPHYSICALEXAM_GEN_ALL_CORE
Vital Signs Last 24 Hrs  T(C): 37.1 (03 Aug 2024 08:15), Max: 37.1 (03 Aug 2024 08:15)  T(F): 98.8 (03 Aug 2024 08:15), Max: 98.8 (03 Aug 2024 08:15)  HR: 91 (03 Aug 2024 11:01) (82 - 103)  BP: 139/73 (03 Aug 2024 11:01) (117/79 - 139/73)  BP(mean): --  RR: 18 (03 Aug 2024 08:15) (18 - 18)  SpO2: 98% (03 Aug 2024 10:42) (92% - 99%)    Parameters below as of 03 Aug 2024 08:15  Patient On (Oxygen Delivery Method): room air    Gen: well-appearing, NAD  Abd: gravid uterus with anterior fibroid palpated, nontender to palpation

## 2024-08-03 NOTE — OB PROVIDER TRIAGE NOTE - NS_PELVICEXAM_OBGYN_ALL_OB
Chief Complaint   Patient presents with    Medication Evaluation     [place] [facility]     results from 01/2021 Yes

## 2024-08-03 NOTE — OB RN TRIAGE NOTE - NS_TRIAGEPROVIDERNOTIFIEDBY_OBGYN_ALL_OB_FT
Medical Week 3 Survey      Responses   Facility patient discharged from?  [facility]   Does the patient have one of the following disease processes/diagnoses(primary or secondary)?  Other   Week 3 attempt successful?  No   Unsuccessful attempts  Attempt 2          Audrey Aguilar RN  
[place]

## 2024-08-04 LAB
CULTURE RESULTS: SIGNIFICANT CHANGE UP
SPECIMEN SOURCE: SIGNIFICANT CHANGE UP

## 2024-08-05 DIAGNOSIS — O99.343 OTHER MENTAL DISORDERS COMPLICATING PREGNANCY, THIRD TRIMESTER: ICD-10-CM

## 2024-08-05 DIAGNOSIS — O09.293 SUPERVISION OF PREGNANCY WITH OTHER POOR REPRODUCTIVE OR OBSTETRIC HISTORY, THIRD TRIMESTER: ICD-10-CM

## 2024-08-05 DIAGNOSIS — O24.410 GESTATIONAL DIABETES MELLITUS IN PREGNANCY, DIET CONTROLLED: ICD-10-CM

## 2024-08-05 DIAGNOSIS — O99.891 OTHER SPECIFIED DISEASES AND CONDITIONS COMPLICATING PREGNANCY: ICD-10-CM

## 2024-08-05 DIAGNOSIS — Z3A.33 33 WEEKS GESTATION OF PREGNANCY: ICD-10-CM

## 2024-08-05 DIAGNOSIS — D21.9 BENIGN NEOPLASM OF CONNECTIVE AND OTHER SOFT TISSUE, UNSPECIFIED: ICD-10-CM

## 2024-08-05 DIAGNOSIS — R10.9 UNSPECIFIED ABDOMINAL PAIN: ICD-10-CM
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
## 2024-08-05 DIAGNOSIS — O36.8130 DECREASED FETAL MOVEMENTS, THIRD TRIMESTER, NOT APPLICABLE OR UNSPECIFIED: ICD-10-CM

## 2024-08-05 DIAGNOSIS — O26.893 OTHER SPECIFIED PREGNANCY RELATED CONDITIONS, THIRD TRIMESTER: ICD-10-CM

## 2024-08-05 DIAGNOSIS — F41.9 ANXIETY DISORDER, UNSPECIFIED: ICD-10-CM
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
## 2024-08-05 DIAGNOSIS — O47.03 FALSE LABOR BEFORE 37 COMPLETED WEEKS OF GESTATION, THIRD TRIMESTER: ICD-10-CM

## 2024-08-12 ENCOUNTER — APPOINTMENT (OUTPATIENT)
Dept: ANTEPARTUM | Facility: CLINIC | Age: 29
End: 2024-08-12
Payer: COMMERCIAL

## 2024-08-12 ENCOUNTER — ASOB RESULT (OUTPATIENT)
Age: 29
End: 2024-08-12

## 2024-08-12 PROCEDURE — 76816 OB US FOLLOW-UP PER FETUS: CPT

## 2024-08-19 ENCOUNTER — ASOB RESULT (OUTPATIENT)
Age: 29
End: 2024-08-19

## 2024-08-19 ENCOUNTER — APPOINTMENT (OUTPATIENT)
Dept: MATERNAL FETAL MEDICINE | Facility: CLINIC | Age: 29
End: 2024-08-19

## 2024-08-19 PROCEDURE — G0108 DIAB MANAGE TRN  PER INDIV: CPT | Mod: 95

## 2024-08-29 ENCOUNTER — APPOINTMENT (OUTPATIENT)
Dept: MATERNAL FETAL MEDICINE | Facility: CLINIC | Age: 29
End: 2024-08-29

## 2024-08-29 ENCOUNTER — ASOB RESULT (OUTPATIENT)
Age: 29
End: 2024-08-29

## 2024-08-29 PROCEDURE — G0108 DIAB MANAGE TRN  PER INDIV: CPT | Mod: 95

## 2024-08-31 ENCOUNTER — INPATIENT (INPATIENT)
Facility: HOSPITAL | Age: 29
LOS: 1 days | Discharge: HOME | DRG: 951 | End: 2024-09-02
Attending: OBSTETRICS & GYNECOLOGY | Admitting: OBSTETRICS & GYNECOLOGY
Payer: COMMERCIAL

## 2024-08-31 VITALS — HEART RATE: 92 BPM | DIASTOLIC BLOOD PRESSURE: 84 MMHG | OXYGEN SATURATION: 99 % | SYSTOLIC BLOOD PRESSURE: 129 MMHG

## 2024-08-31 DIAGNOSIS — Z34.90 ENCOUNTER FOR SUPERVISION OF NORMAL PREGNANCY, UNSPECIFIED, UNSPECIFIED TRIMESTER: ICD-10-CM

## 2024-08-31 DIAGNOSIS — Z98.890 OTHER SPECIFIED POSTPROCEDURAL STATES: Chronic | ICD-10-CM

## 2024-08-31 DIAGNOSIS — O26.899 OTHER SPECIFIED PREGNANCY RELATED CONDITIONS, UNSPECIFIED TRIMESTER: ICD-10-CM

## 2024-08-31 LAB
BASOPHILS # BLD AUTO: 0.02 K/UL — SIGNIFICANT CHANGE UP (ref 0–0.2)
BASOPHILS NFR BLD AUTO: 0.2 % — SIGNIFICANT CHANGE UP (ref 0–2)
BLD GP AB SCN SERPL QL: POSITIVE — SIGNIFICANT CHANGE UP
DAT C3-SP REAG RBC QL: POSITIVE — SIGNIFICANT CHANGE UP
EOSINOPHIL # BLD AUTO: 0.05 K/UL — SIGNIFICANT CHANGE UP (ref 0–0.5)
EOSINOPHIL NFR BLD AUTO: 0.5 % — SIGNIFICANT CHANGE UP (ref 0–6)
GLUCOSE BLDC GLUCOMTR-MCNC: 101 MG/DL — HIGH (ref 70–99)
GLUCOSE BLDC GLUCOMTR-MCNC: 85 MG/DL — SIGNIFICANT CHANGE UP (ref 70–99)
HCT VFR BLD CALC: 37.1 % — SIGNIFICANT CHANGE UP (ref 34.5–45)
HGB BLD-MCNC: 12.6 G/DL — SIGNIFICANT CHANGE UP (ref 11.5–15.5)
IMM GRANULOCYTES NFR BLD AUTO: 0.8 % — SIGNIFICANT CHANGE UP (ref 0–0.9)
LYMPHOCYTES # BLD AUTO: 1.35 K/UL — SIGNIFICANT CHANGE UP (ref 1–3.3)
LYMPHOCYTES # BLD AUTO: 13.7 % — SIGNIFICANT CHANGE UP (ref 13–44)
MCHC RBC-ENTMCNC: 31.5 PG — SIGNIFICANT CHANGE UP (ref 27–34)
MCHC RBC-ENTMCNC: 34 GM/DL — SIGNIFICANT CHANGE UP (ref 32–36)
MCV RBC AUTO: 92.8 FL — SIGNIFICANT CHANGE UP (ref 80–100)
MONOCYTES # BLD AUTO: 0.77 K/UL — SIGNIFICANT CHANGE UP (ref 0–0.9)
MONOCYTES NFR BLD AUTO: 7.8 % — SIGNIFICANT CHANGE UP (ref 2–14)
NEUTROPHILS # BLD AUTO: 7.59 K/UL — HIGH (ref 1.8–7.4)
NEUTROPHILS NFR BLD AUTO: 77 % — SIGNIFICANT CHANGE UP (ref 43–77)
NRBC # BLD: 0 /100 WBCS — SIGNIFICANT CHANGE UP (ref 0–0)
PLATELET # BLD AUTO: 160 K/UL — SIGNIFICANT CHANGE UP (ref 150–400)
RBC # BLD: 4 M/UL — SIGNIFICANT CHANGE UP (ref 3.8–5.2)
RBC # FLD: 14 % — SIGNIFICANT CHANGE UP (ref 10.3–14.5)
RH IG SCN BLD-IMP: POSITIVE — SIGNIFICANT CHANGE UP
T PALLIDUM AB TITR SER: NEGATIVE — SIGNIFICANT CHANGE UP
WBC # BLD: 9.86 K/UL — SIGNIFICANT CHANGE UP (ref 3.8–10.5)
WBC # FLD AUTO: 9.86 K/UL — SIGNIFICANT CHANGE UP (ref 3.8–10.5)

## 2024-08-31 PROCEDURE — 86077 PHYS BLOOD BANK SERV XMATCH: CPT

## 2024-08-31 PROCEDURE — 88307 TISSUE EXAM BY PATHOLOGIST: CPT | Mod: 26

## 2024-08-31 RX ORDER — TETANUS TOXOID, REDUCED DIPHTHERIA TOXOID AND ACELLULAR PERTUSSIS VACCINE, ADSORBED 5; 2.5; 8; 8; 2.5 [IU]/.5ML; [IU]/.5ML; UG/.5ML; UG/.5ML; UG/.5ML
0.5 SUSPENSION INTRAMUSCULAR ONCE
Refills: 0 | Status: DISCONTINUED | OUTPATIENT
Start: 2024-08-31 | End: 2024-09-02

## 2024-08-31 RX ORDER — HYDROCORTISONE 1 %
1 OINTMENT (GRAM) TOPICAL EVERY 6 HOURS
Refills: 0 | Status: DISCONTINUED | OUTPATIENT
Start: 2024-08-31 | End: 2024-09-02

## 2024-08-31 RX ORDER — MENTHOL/CETYLPYRD CL 3 MG
1 LOZENGE MUCOUS MEMBRANE EVERY 6 HOURS
Refills: 0 | Status: DISCONTINUED | OUTPATIENT
Start: 2024-08-31 | End: 2024-09-02

## 2024-08-31 RX ORDER — ACETAMINOPHEN 325 MG/1
975 TABLET ORAL
Refills: 0 | Status: DISCONTINUED | OUTPATIENT
Start: 2024-08-31 | End: 2024-09-02

## 2024-08-31 RX ORDER — OXYCODONE HYDROCHLORIDE 5 MG/1
5 TABLET ORAL ONCE
Refills: 0 | Status: DISCONTINUED | OUTPATIENT
Start: 2024-08-31 | End: 2024-09-02

## 2024-08-31 RX ORDER — KETOROLAC TROMETHAMINE 30 MG/ML
30 INJECTION, SOLUTION INTRAMUSCULAR ONCE
Refills: 0 | Status: DISCONTINUED | OUTPATIENT
Start: 2024-08-31 | End: 2024-08-31

## 2024-08-31 RX ORDER — SODIUM CHLORIDE 9 MG/ML
3 INJECTION INTRAMUSCULAR; INTRAVENOUS; SUBCUTANEOUS EVERY 8 HOURS
Refills: 0 | Status: DISCONTINUED | OUTPATIENT
Start: 2024-08-31 | End: 2024-09-02

## 2024-08-31 RX ORDER — ONDANSETRON 2 MG/ML
4 INJECTION, SOLUTION INTRAMUSCULAR; INTRAVENOUS ONCE
Refills: 0 | Status: DISCONTINUED | OUTPATIENT
Start: 2024-08-31 | End: 2024-08-31

## 2024-08-31 RX ORDER — IBUPROFEN 600 MG
600 TABLET ORAL EVERY 6 HOURS
Refills: 0 | Status: DISCONTINUED | OUTPATIENT
Start: 2024-08-31 | End: 2024-09-02

## 2024-08-31 RX ORDER — WITCH HAZEL 1 G/ML
1 LIQUID TOPICAL EVERY 4 HOURS
Refills: 0 | Status: DISCONTINUED | OUTPATIENT
Start: 2024-08-31 | End: 2024-09-02

## 2024-08-31 RX ORDER — OXYTOCIN 10 UNIT/ML
333.33 AMPUL (ML) INJECTION
Qty: 20 | Refills: 0 | Status: COMPLETED | OUTPATIENT
Start: 2024-08-31 | End: 2024-08-31

## 2024-08-31 RX ORDER — OXYTOCIN 10 UNIT/ML
10 AMPUL (ML) INJECTION ONCE
Refills: 0 | Status: COMPLETED | OUTPATIENT
Start: 2024-08-31 | End: 2024-08-31

## 2024-08-31 RX ORDER — PRAMOXINE HCL 1 %
1 GEL (GRAM) TOPICAL EVERY 4 HOURS
Refills: 0 | Status: DISCONTINUED | OUTPATIENT
Start: 2024-08-31 | End: 2024-09-02

## 2024-08-31 RX ORDER — IBUPROFEN 600 MG
600 TABLET ORAL EVERY 6 HOURS
Refills: 0 | Status: COMPLETED | OUTPATIENT
Start: 2024-08-31 | End: 2025-07-30

## 2024-08-31 RX ORDER — OXYCODONE HYDROCHLORIDE 5 MG/1
5 TABLET ORAL
Refills: 0 | Status: DISCONTINUED | OUTPATIENT
Start: 2024-08-31 | End: 2024-09-02

## 2024-08-31 RX ORDER — VITAMIN A, ASCORBIC ACID, VITAMIN D, .ALPHA.-TOCOPHEROL, THIAMINE MONONITRATE, RIBOFLAVIN, NIACIN, PYRIDOXINE HYDROCHLORIDE, FOLIC ACID, CYANOCOBALAMIN, CALCIUM, IRON, MAGNESIUM, ZINC, AND COPPER 2700; 70; 400; 30; 1.6; 1.8; 18; 2.5; 1; 12; 100; 65; 25; 25; 2 [IU]/1; MG/1; [IU]/1; [IU]/1; MG/1; MG/1; MG/1; MG/1; MG/1; UG/1; MG/1; MG/1; MG/1; MG/1; MG/1
1 TABLET ORAL DAILY
Refills: 0 | Status: DISCONTINUED | OUTPATIENT
Start: 2024-08-31 | End: 2024-09-02

## 2024-08-31 RX ORDER — DIPHENHYDRAMINE HCL 50 MG
25 CAPSULE ORAL EVERY 6 HOURS
Refills: 0 | Status: DISCONTINUED | OUTPATIENT
Start: 2024-08-31 | End: 2024-09-02

## 2024-08-31 RX ORDER — CHLORHEXIDINE GLUCONATE 40 MG/ML
1 SOLUTION TOPICAL DAILY
Refills: 0 | Status: DISCONTINUED | OUTPATIENT
Start: 2024-08-31 | End: 2024-08-31

## 2024-08-31 RX ORDER — SODIUM CHLORIDE 9 MG/ML
1000 INJECTION INTRAMUSCULAR; INTRAVENOUS; SUBCUTANEOUS
Refills: 0 | Status: DISCONTINUED | OUTPATIENT
Start: 2024-08-31 | End: 2024-08-31

## 2024-08-31 RX ORDER — SODIUM CITRATE AND CITRIC ACID MONOHYDRATE 334; 500 MG/5ML; MG/5ML
15 SOLUTION ORAL EVERY 6 HOURS
Refills: 0 | Status: DISCONTINUED | OUTPATIENT
Start: 2024-08-31 | End: 2024-08-31

## 2024-08-31 RX ORDER — OXYTOCIN 10 UNIT/ML
4 AMPUL (ML) INJECTION
Qty: 30 | Refills: 0 | Status: DISCONTINUED | OUTPATIENT
Start: 2024-08-31 | End: 2024-09-02

## 2024-08-31 RX ORDER — OXYTOCIN 10 UNIT/ML
41.67 AMPUL (ML) INJECTION
Qty: 20 | Refills: 0 | Status: DISCONTINUED | OUTPATIENT
Start: 2024-08-31 | End: 2024-09-02

## 2024-08-31 RX ORDER — LANOLIN
1 OINTMENT (GRAM) TOPICAL EVERY 6 HOURS
Refills: 0 | Status: DISCONTINUED | OUTPATIENT
Start: 2024-08-31 | End: 2024-09-02

## 2024-08-31 RX ADMIN — SODIUM CHLORIDE 125 MILLILITER(S): 9 INJECTION INTRAMUSCULAR; INTRAVENOUS; SUBCUTANEOUS at 10:35

## 2024-08-31 RX ADMIN — Medication 125 MILLILITER(S): at 07:30

## 2024-08-31 RX ADMIN — Medication 600 MILLIGRAM(S): at 21:50

## 2024-08-31 RX ADMIN — Medication 10 UNIT(S): at 13:46

## 2024-08-31 RX ADMIN — ACETAMINOPHEN 975 MILLIGRAM(S): 325 TABLET ORAL at 19:01

## 2024-08-31 RX ADMIN — KETOROLAC TROMETHAMINE 30 MILLIGRAM(S): 30 INJECTION, SOLUTION INTRAMUSCULAR at 15:04

## 2024-08-31 RX ADMIN — SODIUM CHLORIDE 125 MILLILITER(S): 9 INJECTION INTRAMUSCULAR; INTRAVENOUS; SUBCUTANEOUS at 07:00

## 2024-08-31 RX ADMIN — SODIUM CHLORIDE 3 MILLILITER(S): 9 INJECTION INTRAMUSCULAR; INTRAVENOUS; SUBCUTANEOUS at 22:14

## 2024-08-31 RX ADMIN — Medication 125 MILLIUNIT(S)/MIN: at 15:02

## 2024-08-31 RX ADMIN — Medication 600 MILLIGRAM(S): at 21:19

## 2024-08-31 RX ADMIN — SODIUM CHLORIDE 125 MILLILITER(S): 9 INJECTION INTRAMUSCULAR; INTRAVENOUS; SUBCUTANEOUS at 07:45

## 2024-08-31 RX ADMIN — Medication 1000 MILLIUNIT(S)/MIN: at 13:46

## 2024-08-31 RX ADMIN — ACETAMINOPHEN 975 MILLIGRAM(S): 325 TABLET ORAL at 19:40

## 2024-08-31 NOTE — OB RN DELIVERY SUMMARY - NSMECDELIVBABYA_OBGYN_ALL_OB
Implemented All Universal Safety Interventions:  [place] to call system. Call bell, personal items and telephone within reach. Instruct patient to call for assistance. Room bathroom lighting operational. Non-slip footwear when patient is off stretcher. Physically safe environment: no spills, clutter or unnecessary equipment. Stretcher in lowest position, wheels locked, appropriate side rails in place. N/A

## 2024-08-31 NOTE — OB PROVIDER H&P - ASSESSMENT
28yo F  @37+3 with SROM@345a    Plan  - Admit to L&D. Routine Labs. IVF.  - Will re-examine in 2hrs to determine if patient needs augmentation with pitocin  - Fetus: cat 1 tracing. VTX. EFW 3175 by [facility]. Continuous EFM. No concerns.  - h/o ITP not on steroids with normal platelets this pregnancy. Last Plt 184 ()  - Prenatal issues: GDMA1  - h/o warm agglutinin antibodies, will have 2u blood on hold  - GBS neg, antibiotics not indicated  - Pain: epidural PRN    Patient discussed with attending physician, Dr. Adorno.    MAXIMILIAN Fenton, PGY4

## 2024-08-31 NOTE — OB RN DELIVERY SUMMARY - BABY A: APGAR 5 MIN RESP RATE, DELIVERY
(2) good, crying (1) other risk factor (includes escalating BMI, pack-years of smoking, diabetes requiring insulin, chemotherapy, female gender and length of surgery)

## 2024-08-31 NOTE — OB PROVIDER LABOR PROGRESS NOTE - NS_EFFACEMANT_OBGYN_ALL_OB_NU
100 DR. BLOCH, ATTENDING MD-  I performed a face to face bedside interview with patient regarding history of present illness, review of symptoms and past medical history. I completed an independent physical exam.  I have discussed patient's plan of care with the resident.  Patient febrile, coughing, hx of metastatic ca to liver, just d/c for febrile illness, no source found. WBC 06608, HEENT nml lungs clear, heart no mgr,abd soft hepatomegaly to 4 cm below diaphragm, mildly tender. ext no edema, no calf tenderness, no port- fever- not clear on source, BC, UC cxr antibiotics.

## 2024-08-31 NOTE — OB PROVIDER H&P - NSLOWPPHRISK_OBGYN_A_OB
No previous uterine incision/[place] Pregnancy/Less than or equal to 4 previous vaginal births/No known bleeding disorder/No history of postpartum hemorrhage/No other PPH risks indicated

## 2024-08-31 NOTE — OB RN DELIVERY SUMMARY - NSSELHIDDEN_OBGYN_ALL_OB_FT
[NS_DeliveryAttending1_OBGYN_ALL_OB_FT:[email or web address]=],[NS_DeliveryRN_OBGYN_ALL_OB_FT:[id number]=]

## 2024-08-31 NOTE — OB PROVIDER H&P - NSHPPHYSICALEXAM_GEN_ALL_CORE
T(C): --  HR: 85 (08-31-24 @ 06:08) (85 - 99)  BP: 129/84 (08-31-24 @ 05:43) (129/84 - 129/84)  RR: --  SpO2: 98% (08-31-24 @ 06:08) (88% - 99%)    Gen: NAD  CV: RRR  Pulm: breathing comfortably on RA  Abd: gravid, nontender  Extr: moving all extremities with ease  – Spec: pos pooling, pos nitrazine, neg bleeding  – VE: 2/60/-3  – FHT Cat I: baseline 140, mod variability, +accels, -decels  – [place]: q8 min  – Sono: vertex, anterior placenta

## 2024-08-31 NOTE — OB RN DELIVERY SUMMARY - NS_SEPSISRSKCALC_OBGYN_ALL_OB_FT
EOS calculated successfully. EOS Risk Factor: 0.5/1000 live births ([facility] national incidence); GA=37w3d; Temp=99; ROM=9.95; GBS='Negative'; Antibiotics='No antibiotics or any antibiotics < 2 hrs prior to birth'

## 2024-08-31 NOTE — OB PROVIDER LABOR PROGRESS NOTE - ASSESSMENT
A/P:  -[facility]/[place]  -Anticipate   D/w Dr. Adorno who is en route  Sharonsa Joaquin MENCHACA

## 2024-08-31 NOTE — OB PROVIDER LABOR PROGRESS NOTE - ASSESSMENT
A/P:  -EF[facility]/[place]  -Anticipate   Plan to be d/w Dr. Kyree Nuñez PA-C A/P:  -EF[facility]/[place]  -Anticipate   Plan to be d/w Dr. Kyree Nuñez PA-C  -------------------------  PA Addendum:  Patient now requesting an epidural. Anesthesia notified. Patient for pitocin s/p epidural.  D/w Dr. Kyree Nuñez PA-C

## 2024-08-31 NOTE — OB PROVIDER LABOR PROGRESS NOTE - NS_SUBJECTIVE/OBJECTIVE_OBGYN_ALL_OB_FT
PA Note:  Patient seen and evaluated at bedside. Patient c/o pressure.
PA Note:  Patient seen and evaluated at bedside. Patient c/o worsening ctx pain, not currently requesting an epidural.

## 2024-08-31 NOTE — OB PROVIDER DELIVERY SUMMARY - NSPROVIDERDELIVERYNOTE_OBGYN_ALL_OB_FT
, pt delivered of a viable female infant apgar 8/9, weight 5-1 at 37-3 (SGA), placenta sent. placenta complete and uterus explored. first degree laceration. ebl 250 cc. relatively rapid labor

## 2024-08-31 NOTE — OB PROVIDER DELIVERY SUMMARY - NSLOWPPHRISK_OBGYN_A_OB
No previous uterine incision/[place] Pregnancy/Less than or equal to 4 previous vaginal births/No known bleeding disorder/No history of postpartum hemorrhage/No other PPH risks indicated

## 2024-08-31 NOTE — OB RN PATIENT PROFILE - NSSDOHLIVINGSIT_OBGYN_A_OB
PATIENT STATED CLONAZEPAM  IS WORKING.  PATIENT REQUESTING PRESCRIPTION 3 TIME A DAY, 1 AT BEDTIME.        clonazePAM (KlonoPIN) 1 MG tablet        PHARMACY:    [facility] - [place] [address] [address] - [phone]  - [phone] FX  [phone]        CALL BACK:  [phone]   I have a steady place to live

## 2024-08-31 NOTE — OB PROVIDER H&P - HISTORY OF PRESENT ILLNESS
HPI: 28yo F  @37+3 here with leaking of fluid since 3:45a, clear fluid. Patient reports moderately painful q8 min contractions. She reports normal fetal movement and denies vaginal bleeding. Denies headaches, CP/SOB, changes in vision or other complaints.     PNC: GDMA1, h/o ITP with normal platelets this pregnancy  GBS neg    OBHx: missed  s/p vacuum aspiration  GynHx: partial uterine septum - no resection. 4.6cm anterior fibroid.   PMH: Migraines w/ aura, h/o ITP. Denies hx clotting or bleeding disorders, HTN, DM  PSH: ACL repair, meniscus repair, deviated nasal septum repair  PFH: no hx congenital disorders, bleeding/clotting disorders  Psych: denies   Social: denies etoh, smoking, drugs. Safe at home/in relationship.  Meds: PNV   Allergies: NKDA  Will accept blood transfusions? Yes

## 2024-08-31 NOTE — OB RN PATIENT PROFILE - FALL HARM RISK - UNIVERSAL INTERVENTIONS
Bed in lowest position, wheels locked, appropriate side rails in place/Call bell, personal items and telephone in reach/Instruct patient to call for assistance before getting out of bed or chair/Non-slip footwear when patient is out of bed/[place] to call system/Physically safe environment - no spills, clutter or unnecessary equipment/Purposeful Proactive Rounding/Room/bathroom lighting operational, light cord in reach

## 2024-09-01 PROCEDURE — 93970 EXTREMITY STUDY: CPT | Mod: 26

## 2024-09-01 RX ADMIN — Medication 600 MILLIGRAM(S): at 10:09

## 2024-09-01 RX ADMIN — Medication 600 MILLIGRAM(S): at 23:38

## 2024-09-01 RX ADMIN — Medication 600 MILLIGRAM(S): at 09:39

## 2024-09-01 RX ADMIN — Medication 600 MILLIGRAM(S): at 18:44

## 2024-09-01 RX ADMIN — Medication 600 MILLIGRAM(S): at 19:28

## 2024-09-01 RX ADMIN — SODIUM CHLORIDE 3 MILLILITER(S): 9 INJECTION INTRAMUSCULAR; INTRAVENOUS; SUBCUTANEOUS at 05:12

## 2024-09-01 RX ADMIN — ACETAMINOPHEN 975 MILLIGRAM(S): 325 TABLET ORAL at 05:38

## 2024-09-01 NOTE — PROGRESS NOTE ADULT - ASSESSMENT
A/P: 28yo  PPD#1 s/p .  Patient is stable and doing well post-partum.   #calf pain  - No erythema/warmth, calves symmetric b/l  - LLE doppler to r/o DVT    #  - Pain well controlled, continue current pain regimen  - Continue ambulation  - Continue regular diet      Keegan Regalado PGY1

## 2024-09-01 NOTE — PROGRESS NOTE ADULT - SUBJECTIVE AND OBJECTIVE BOX
OB Progress Note:  PPD#1    S: 30yo  PPD#1 s/p . Patient feels well. Pain is well controlled. She is tolerating a regular diet and passing flatus. She is voiding spontaneously, and ambulating without difficulty. Endorses light vaginal bleeding, soaking less than 1 pad/hour.     O:  Vitals:  Vital Signs Last 24 Hrs  T(C): 36.7 (01 Sep 2024 05:30), Max: 37.6 (31 Aug 2024 16:50)  T(F): 98 (01 Sep 2024 05:30), Max: 99.7 (31 Aug 2024 16:50)  HR: 77 (01 Sep 2024 05:30) (58 - 175)  BP: 125/84 (01 Sep 2024 05:30) (84/48 - 185/67)  BP(mean): --  RR: 18 (01 Sep 2024 05:30) (16 - 18)  SpO2: 97% (01 Sep 2024 05:30) (76% - 100%)    Parameters below as of 01 Sep 2024 05:30  Patient On (Oxygen Delivery Method): room air        MEDICATIONS  (STANDING):  acetaminophen     Tablet .. 975 milliGRAM(s) Oral <User Schedule>  diphtheria/tetanus/pertussis (acellular) Vaccine (Adacel) 0.5 milliLiter(s) IntraMuscular once  ibuprofen  Tablet. 600 milliGRAM(s) Oral every 6 hours  lactated ringers Bolus 500 milliLiter(s) IV Bolus once  oxytocin Infusion 41.667 milliUNIT(s)/Min (125 mL/Hr) IV Continuous <Continuous>  oxytocin Infusion. 4 milliUNIT(s)/Min (4 mL/Hr) IV Continuous <Continuous>  prenatal multivitamin 1 Tablet(s) Oral daily  sodium chloride 0.9% lock flush 3 milliLiter(s) IV Push every 8 hours      Labs:  Blood type: O Positive  Rubella IgG: RPR: Negative                          12.6   9.86 >-----------< 160    (  @ 07:27 )             37.1                  Physical Exam:  General: NAD  Heart: all extremities well perfused  Lungs: breathing comfortably  Abdomen: soft, fundus firm  Extremities: L upper calf tenderness to palpation; no calf pain at rest b/l; calves b/l symmetrical, no erythema/warmth b/l; 1+ pitting edema in feet

## 2024-09-01 NOTE — CHART NOTE - NSCHARTNOTEFT_GEN_A_CORE
Patient seen for: Nutrition consult for GDM postpartum education prior to discharge    Source: Patient and Medical Record    Patient is: ; GDM [A1]    Chart reviewed, events noted. Meds/Labs reviewed.    Confirms no known food allergy. No known recent N/V, constipation, or diarrhea. No BM since delivery.     Pt plans on breast feeding.     Anthropometrics:  Height: 5 foot 4 inches  Pre-pregnancy weight: 165 pounds   Weight gain: 23 pounds   Admit/Dosing wt: 188 pounds     Nutrition Diagnosis:   Food and Nutrition Related Knowledge Deficit related to limited previous exposure to postpartum GDM nutrition education and recommendations as evidenced by GDM postpartum.    Goal: Pt to state at least two teach back points.    Intervention:  1. PPGDM  2. Handout: "What to expect now that you have had your baby"     Monitoring:  No other nutrition risks were identified during this encounter.    RD remains available upon request and will follow up per protocol.  Suha Hsu RDN, CDN / TEAMS

## 2024-09-02 ENCOUNTER — TRANSCRIPTION ENCOUNTER (OUTPATIENT)
Age: 29
End: 2024-09-02

## 2024-09-02 VITALS
OXYGEN SATURATION: 98 % | SYSTOLIC BLOOD PRESSURE: 117 MMHG | DIASTOLIC BLOOD PRESSURE: 78 MMHG | HEART RATE: 99 BPM | RESPIRATION RATE: 18 BRPM | TEMPERATURE: 98 F

## 2024-09-02 PROCEDURE — 82962 GLUCOSE BLOOD TEST: CPT

## 2024-09-02 PROCEDURE — 86900 BLOOD TYPING SEROLOGIC ABO: CPT

## 2024-09-02 PROCEDURE — 59050 FETAL MONITOR W/REPORT: CPT

## 2024-09-02 PROCEDURE — 86880 COOMBS TEST DIRECT: CPT

## 2024-09-02 PROCEDURE — 86870 RBC ANTIBODY IDENTIFICATION: CPT

## 2024-09-02 PROCEDURE — 86780 TREPONEMA PALLIDUM: CPT

## 2024-09-02 PROCEDURE — 85025 COMPLETE CBC W/AUTO DIFF WBC: CPT

## 2024-09-02 PROCEDURE — 88307 TISSUE EXAM BY PATHOLOGIST: CPT

## 2024-09-02 PROCEDURE — 86850 RBC ANTIBODY SCREEN: CPT

## 2024-09-02 PROCEDURE — 86901 BLOOD TYPING SEROLOGIC RH(D): CPT

## 2024-09-02 PROCEDURE — 93970 EXTREMITY STUDY: CPT

## 2024-09-02 PROCEDURE — 86922 COMPATIBILITY TEST ANTIGLOB: CPT

## 2024-09-02 RX ORDER — ACETAMINOPHEN 325 MG/1
3 TABLET ORAL
Qty: 0 | Refills: 0 | DISCHARGE
Start: 2024-09-02

## 2024-09-02 RX ORDER — IBUPROFEN 600 MG
1 TABLET ORAL
Qty: 0 | Refills: 0 | DISCHARGE
Start: 2024-09-02

## 2024-09-02 RX ADMIN — VITAMIN A, ASCORBIC ACID, VITAMIN D, .ALPHA.-TOCOPHEROL, THIAMINE MONONITRATE, RIBOFLAVIN, NIACIN, PYRIDOXINE HYDROCHLORIDE, FOLIC ACID, CYANOCOBALAMIN, CALCIUM, IRON, MAGNESIUM, ZINC, AND COPPER 1 TABLET(S): 2700; 70; 400; 30; 1.6; 1.8; 18; 2.5; 1; 12; 100; 65; 25; 25; 2 TABLET ORAL at 11:51

## 2024-09-02 RX ADMIN — Medication 600 MILLIGRAM(S): at 11:52

## 2024-09-02 RX ADMIN — ACETAMINOPHEN 975 MILLIGRAM(S): 325 TABLET ORAL at 09:04

## 2024-09-02 RX ADMIN — ACETAMINOPHEN 975 MILLIGRAM(S): 325 TABLET ORAL at 09:50

## 2024-09-02 RX ADMIN — Medication 600 MILLIGRAM(S): at 00:10

## 2024-09-02 RX ADMIN — Medication 600 MILLIGRAM(S): at 06:05

## 2024-09-02 RX ADMIN — Medication 30 MILLILITER(S): at 11:07

## 2024-09-02 RX ADMIN — Medication 600 MILLIGRAM(S): at 12:40

## 2024-09-02 NOTE — DISCHARGE NOTE OB - CARE PROVIDER_API CALL
Katie Adorno  Obstetrics and Gynecology  [address], [address]  [place] [address]  Phone: [phone]  Fax: [phone]  Follow Up Time: 1 month

## 2024-09-02 NOTE — DISCHARGE NOTE OB - PLAN OF CARE
After discharge, please stay on pelvic rest for 6 weeks, meaning no sexual intercourse, no tampons and no douching.  No driving for 2 weeks as women can lose a lot of blood during delivery and there is a possibility of being lightheaded/fainting.  No lifting objects heavier than baby for two weeks.  Expect to have vaginal bleeding/spotting for up to six weeks. The bleeding should get lighter and more white/light brown with time.  For bleeding soaking more than a pad an hour or passing clots greater than the size of your fist, come in to the emergency department.    Follow up with your doctor in 6 weeks.

## 2024-09-02 NOTE — DISCHARGE NOTE OB - CARE PLAN
1 Principal Discharge DX:	Normal vaginal delivery  Assessment and plan of treatment:	After discharge, please stay on pelvic rest for 6 weeks, meaning no sexual intercourse, no tampons and no douching.  No driving for 2 weeks as women can lose a lot of blood during delivery and there is a possibility of being lightheaded/fainting.  No lifting objects heavier than baby for two weeks.  Expect to have vaginal bleeding/spotting for up to six weeks. The bleeding should get lighter and more white/light brown with time.  For bleeding soaking more than a pad an hour or passing clots greater than the size of your fist, come in to the emergency department.    Follow up with your doctor in 6 weeks.

## 2024-09-02 NOTE — DISCHARGE NOTE OB - PATIENT PORTAL LINK FT
You can access the FollowMyHealth Patient Portal offered by [facility] by registering at the following website: [email or web address]. By joining [employer]’s FollowMyHealth portal, you will also be able to view your health information using other applications (apps) compatible with our system.

## 2024-09-02 NOTE — DISCHARGE NOTE OB - HOSPITAL COURSE
Patient had an uncomplicated  followed by an uncomplicated postpartum course. . On postpartum day 1, patient was discharged home in stable condition, voiding spontaneously and with normal vital signs.  Patient had an uncomplicated  followed by an uncomplicated postpartum course. . On postpartum day 2, patient was discharged home in stable condition, voiding spontaneously and with normal vital signs.

## 2024-09-09 ENCOUNTER — APPOINTMENT (OUTPATIENT)
Dept: ANTEPARTUM | Facility: CLINIC | Age: 29
End: 2024-09-09

## 2024-09-10 ENCOUNTER — TRANSCRIPTION ENCOUNTER (OUTPATIENT)
Age: 29
End: 2024-09-10

## 2024-09-24 LAB — SURGICAL PATHOLOGY STUDY: SIGNIFICANT CHANGE UP

## 2024-11-25 ENCOUNTER — INPATIENT (INPATIENT)
Facility: HOSPITAL | Age: 29
LOS: 1 days | Discharge: HOME | DRG: 123 | End: 2024-11-27
Attending: PSYCHIATRY & NEUROLOGY | Admitting: PSYCHIATRY & NEUROLOGY
Payer: COMMERCIAL

## 2024-11-25 VITALS
RESPIRATION RATE: 17 BRPM | DIASTOLIC BLOOD PRESSURE: 88 MMHG | OXYGEN SATURATION: 98 % | HEIGHT: 64 IN | HEART RATE: 85 BPM | WEIGHT: 169.98 LBS | TEMPERATURE: 98 F | SYSTOLIC BLOOD PRESSURE: 124 MMHG

## 2024-11-25 DIAGNOSIS — H46.9 UNSPECIFIED OPTIC NEURITIS: ICD-10-CM

## 2024-11-25 DIAGNOSIS — Z98.890 OTHER SPECIFIED POSTPROCEDURAL STATES: Chronic | ICD-10-CM

## 2024-11-25 LAB
ADD ON TEST-SPECIMEN IN LAB: SIGNIFICANT CHANGE UP
ALBUMIN SERPL ELPH-MCNC: 4.3 G/DL — SIGNIFICANT CHANGE UP (ref 3.3–5)
ALP SERPL-CCNC: 98 U/L — SIGNIFICANT CHANGE UP (ref 40–120)
ALT FLD-CCNC: 21 U/L — SIGNIFICANT CHANGE UP (ref 10–45)
ANION GAP SERPL CALC-SCNC: 13 MMOL/L — SIGNIFICANT CHANGE UP (ref 5–17)
APTT BLD: 31.9 SEC — SIGNIFICANT CHANGE UP (ref 24.5–35.6)
AST SERPL-CCNC: 17 U/L — SIGNIFICANT CHANGE UP (ref 10–40)
BASOPHILS # BLD AUTO: 0.03 K/UL — SIGNIFICANT CHANGE UP (ref 0–0.2)
BASOPHILS NFR BLD AUTO: 0.6 % — SIGNIFICANT CHANGE UP (ref 0–2)
BILIRUB SERPL-MCNC: 0.6 MG/DL — SIGNIFICANT CHANGE UP (ref 0.2–1.2)
BUN SERPL-MCNC: 13 MG/DL — SIGNIFICANT CHANGE UP (ref 7–23)
CALCIUM SERPL-MCNC: 9.4 MG/DL — SIGNIFICANT CHANGE UP (ref 8.4–10.5)
CHLORIDE SERPL-SCNC: 104 MMOL/L — SIGNIFICANT CHANGE UP (ref 96–108)
CO2 SERPL-SCNC: 22 MMOL/L — SIGNIFICANT CHANGE UP (ref 22–31)
CREAT SERPL-MCNC: 0.61 MG/DL — SIGNIFICANT CHANGE UP (ref 0.5–1.3)
EGFR: 124 ML/MIN/1.73M2 — SIGNIFICANT CHANGE UP
EOSINOPHIL # BLD AUTO: 0.11 K/UL — SIGNIFICANT CHANGE UP (ref 0–0.5)
EOSINOPHIL NFR BLD AUTO: 2.3 % — SIGNIFICANT CHANGE UP (ref 0–6)
GLUCOSE SERPL-MCNC: 86 MG/DL — SIGNIFICANT CHANGE UP (ref 70–99)
HCT VFR BLD CALC: 38.5 % — SIGNIFICANT CHANGE UP (ref 34.5–45)
HGB BLD-MCNC: 12.8 G/DL — SIGNIFICANT CHANGE UP (ref 11.5–15.5)
IMM GRANULOCYTES NFR BLD AUTO: 0.2 % — SIGNIFICANT CHANGE UP (ref 0–0.9)
INR BLD: 0.97 RATIO — SIGNIFICANT CHANGE UP (ref 0.85–1.16)
LACTATE BLDV-MCNC: 0.7 MMOL/L — SIGNIFICANT CHANGE UP (ref 0.5–2)
LYMPHOCYTES # BLD AUTO: 1.7 K/UL — SIGNIFICANT CHANGE UP (ref 1–3.3)
LYMPHOCYTES # BLD AUTO: 35.1 % — SIGNIFICANT CHANGE UP (ref 13–44)
MCHC RBC-ENTMCNC: 28.8 PG — SIGNIFICANT CHANGE UP (ref 27–34)
MCHC RBC-ENTMCNC: 33.2 G/DL — SIGNIFICANT CHANGE UP (ref 32–36)
MCV RBC AUTO: 86.5 FL — SIGNIFICANT CHANGE UP (ref 80–100)
MONOCYTES # BLD AUTO: 0.42 K/UL — SIGNIFICANT CHANGE UP (ref 0–0.9)
MONOCYTES NFR BLD AUTO: 8.7 % — SIGNIFICANT CHANGE UP (ref 2–14)
NEUTROPHILS # BLD AUTO: 2.58 K/UL — SIGNIFICANT CHANGE UP (ref 1.8–7.4)
NEUTROPHILS NFR BLD AUTO: 53.1 % — SIGNIFICANT CHANGE UP (ref 43–77)
NRBC # BLD: 0 /100 WBCS — SIGNIFICANT CHANGE UP (ref 0–0)
PLATELET # BLD AUTO: 208 K/UL — SIGNIFICANT CHANGE UP (ref 150–400)
POTASSIUM SERPL-MCNC: 4.1 MMOL/L — SIGNIFICANT CHANGE UP (ref 3.5–5.3)
POTASSIUM SERPL-SCNC: 4.1 MMOL/L — SIGNIFICANT CHANGE UP (ref 3.5–5.3)
PROT SERPL-MCNC: 7.9 G/DL — SIGNIFICANT CHANGE UP (ref 6–8.3)
PROTHROM AB SERPL-ACNC: 11.2 SEC — SIGNIFICANT CHANGE UP (ref 9.9–13.4)
RBC # BLD: 4.45 M/UL — SIGNIFICANT CHANGE UP (ref 3.8–5.2)
RBC # FLD: 12.1 % — SIGNIFICANT CHANGE UP (ref 10.3–14.5)
SODIUM SERPL-SCNC: 139 MMOL/L — SIGNIFICANT CHANGE UP (ref 135–145)
WBC # BLD: 4.85 K/UL — SIGNIFICANT CHANGE UP (ref 3.8–10.5)
WBC # FLD AUTO: 4.85 K/UL — SIGNIFICANT CHANGE UP (ref 3.8–10.5)

## 2024-11-25 PROCEDURE — 70543 MRI ORBT/FAC/NCK W/O &W/DYE: CPT | Mod: 26,MC

## 2024-11-25 PROCEDURE — 99221 1ST HOSP IP/OBS SF/LOW 40: CPT

## 2024-11-25 PROCEDURE — 70553 MRI BRAIN STEM W/O & W/DYE: CPT | Mod: 26,MC

## 2024-11-25 PROCEDURE — 70496 CT ANGIOGRAPHY HEAD: CPT | Mod: 26,MC

## 2024-11-25 PROCEDURE — 99223 1ST HOSP IP/OBS HIGH 75: CPT

## 2024-11-25 PROCEDURE — 70450 CT HEAD/BRAIN W/O DYE: CPT | Mod: 26,MC,59

## 2024-11-25 RX ORDER — POVIDONE, POLYVINYL ALCOHOL 20; 27 G/1000ML; G/1000ML
1 SOLUTION OPHTHALMIC
Refills: 0 | Status: DISCONTINUED | OUTPATIENT
Start: 2024-11-25 | End: 2024-11-27

## 2024-11-25 RX ORDER — METHYLPREDNISOLONE SOD SUCC 125 MG
1000 VIAL (EA) INJECTION DAILY
Refills: 0 | Status: DISCONTINUED | OUTPATIENT
Start: 2024-11-25 | End: 2024-11-26

## 2024-11-25 RX ORDER — ACETAMINOPHEN 500MG 500 MG/1
650 TABLET, COATED ORAL ONCE
Refills: 0 | Status: COMPLETED | OUTPATIENT
Start: 2024-11-25 | End: 2024-11-26

## 2024-11-25 RX ADMIN — POVIDONE, POLYVINYL ALCOHOL 1 DROP(S): 20; 27 SOLUTION OPHTHALMIC at 22:09

## 2024-11-25 NOTE — H&P ADULT - NSHPLABSRESULTS_GEN_ALL_CORE
12.8   4.85  )-----------( 208      ( 25 Nov 2024 12:29 )             38.5     11-25    139  |  104  |  13  ----------------------------<  86  4.1   |  22  |  0.61    Ca    9.4      25 Nov 2024 12:29    TPro  7.9  /  Alb  4.3  /  TBili  0.6  /  DBili  x   /  AST  17  /  ALT  21  /  AlkPhos  98  11-25    CAPILLARY BLOOD GLUCOSE        LIVER FUNCTIONS - ( 25 Nov 2024 12:29 )  Alb: 4.3 g/dL / Pro: 7.9 g/dL / ALK PHOS: 98 U/L / ALT: 21 U/L / AST: 17 U/L / GGT: x             PT/INR - ( 25 Nov 2024 12:29 )   PT: 11.2 sec;   INR: 0.97 ratio         PTT - ( 25 Nov 2024 12:29 )  PTT:31.9 sec  CSF:                  Radiology:  MR Head w/wo IV Cont:  (25 Nov 2024 17:40)  CT Head No Cont:  (25 Nov 2024 15:14)    Left eye pain, blurry vision, left  temporal hemorrhagic      Magnetic resonance imaging of the brain was carried out with transaxial   SPGR, FLAIR, fast spin echo T2 weighted images, axial susceptibility   weighted series, diffusion weighted series and sagittal T1 weighted   series on a 1.5 Hazel magnet. Post contrast axial, coronal and sagittal   T1 weighted images were obtained. Thin axial and coronal T1 and   T2-weighted series through the orbits were obtained.  7.5 cc cc of   Gadavist were intravenously injected, 0 cc were discarded.    Comparison is made with the prior CT/CTA of 11/25/2024.    The fourth, third and lateral ventricles are normal in size and position.   There is no hemorrhage, mass or shift of the midline structures. No   abnormal signal intensity is identified within the brain parenchyma on   the T1, T2 or FLAIR sequences. No acute infarcts are identified. There is   no old or new hemorrhage. The sellar and parasellar structures are   unremarkable. The paranasal sinuses are clear. Low signal intensity   involving the calvarial and cervical spine marrow is suggestive of   changes of anemia or increased red marrow.    Normal intracranial vascular enhancement is identified. No abnormal   parenchymal or leptomeningeal enhancement is identified.      Thin sections through the orbits demonstrates enhancement of the left   optic nerve within the orbit and extending through the optic canal with   optic neuritis. The globes, extraocular muscles are unremarkable. The   right orbit is normal.        IMPRESSION: Enhancement of the left optic nerve suggesting optic   neuritis. No white matter lesions are identified. No abnormal parenchymal   or leptomeningeal enhancement.

## 2024-11-25 NOTE — H&P ADULT - ASSESSMENT
LATIA MCCOLLUM is a 29y (1995) woman with a PMHx significant for history of ocular migraines that are different than current symptoms. Ocular migraines last 10-15 minutes and do not have a headache component. 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 (Vision loss began thursday)  Patient sent in by [facility] for disc swelling and blurry vision in the left eye. Neurology consulted for above.     Impression:  Left sided ocular pain with progression to headaches and left vision loss in the setting of confirmed optic neuritis. Further workup pending     Plan:  [] Admit to general neurology floor  [x] MR Brain w/wo completed  [x] Optho Recs appreciated  [ ] Per discussion with hematology fellow Dr. Boykin, no contraindication to Solumedrol  [] IV Solumedrol 1g x 3days  [] Labs: CBC with diff, ESR, CRP, anti-AQP4 and anti-MOG antibodies, lyme ab, syphilis  [] Consider lumbar puncture in the AM  [] Neuro checks q4h  [] Diet: general  [] DVT ppx: Hold for possible LP     Discussed with attending Dr. Marie. To be formally staffed with attending on AM rounds. Case and plan not finalized until attending attestation

## 2024-11-25 NOTE — ED CDU PROVIDER INITIAL DAY NOTE - ATTENDING APP SHARED VISIT CONTRIBUTION OF CARE
ATTENDING, Aldo CHENG: I have personally performed a face to face diagnostic evaluation on this patient.  I have reviewed the ACP note and agree with the history, exam, and plan of care, except as noted here. Progress notes and further evaluation to be reviewed by observation and discharging attending.

## 2024-11-25 NOTE — H&P ADULT - NSHPPHYSICALEXAM_GEN_ALL_CORE
General - NAD  Cardiovascular - Peripheral pulses palpable, no edema  Neurological Examination:  - Mental Status: Eyes open, attends to examiner; oriented to person, age, month, year, location, and situation; speech is fluent with intact naming, intact repetition, and follows 1-step and 3-step mid-line crossing commands;   - Cranial Nerves:  II: Visual fields are full to confrontation; pupils are dilated post opthalmology eval   III, IV, VI: Extraocular movements are limited to pain on the left   V: Facial sensation is intact in the V1-V3 distribution bilaterally  VII: Face is symmetric with normal eye closure and smile  VIII: Hearing is intact to conversation  IX, X: Uvula is midline and soft palate rises symmetrically  XI: Shoulder shrug intact  XII: Tongue protrudes in the midline   Motor/Strength Testing:  No drifts x 4 extremities. - There is no pronator drift.   - Normal muscle bulk and tone throughout. Strength 5/5 in all tested muscle groups.  Reflexes:   Bicep (C5/C6):                  R 2+ --- L 2+   Brachioradialis (C5/C6) :   R 2+ --- L 2+   Patella (L3/L4) :                 R 2+ --- L 2+   Ankle (S1) :                       R 2+ --- L 2+   Plantars downgoing bilaterally  Sensory: Intact throughout to light touch x 4 extremities.  - Coordination: Finger-nose-finger intact without ataxia or dysmetria.    - Gait: Normal steps, base, arm swing, and turning.

## 2024-11-25 NOTE — CONSULT NOTE ADULT - ASSESSMENT
LATIA MCCOLLUM is a 29y (1995) woman with a PMHx significant for history of ocular migraines that are different than current symptoms. Ocular migraines last 10-15 minutes and do not have a headache component. 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 (Vision loss began thursday)  Patient sent in by [facility] for disc swelling and blurry vision in the left eye. Neurology consulted for above.     Impression:  Left sided ocular pain with progression to headaches and left vision loss in the setting of confirmed optic neuritis. Further workup pending     Recommendations:  [x] MR Brain w/wo completed  [x] Optho Recs appreciated  [] Pending medicine/heme onc approval given hx of ITP, start Solumedrol 1g x 3days  [] Plan for Lumbar Puncture Tmrw     Case discussed with General Neurology Attending. Pending chart attestation.

## 2024-11-25 NOTE — ED CDU PROVIDER INITIAL DAY NOTE - PHYSICAL EXAMINATION
GENERAL: NAD  HEENT:  Atraumatic, PEERLA, EOM intact, tenderness to left temporal region  Visual Acuity:  w/ corrective lenses  Left eye: 20/80   Right eye: 20/20  Both eyes: 20/20  CHEST/LUNG: Chest rise equal bilaterally, clear breath sounds b/l  HEART: Regular rate and rhythm  ABDOMEN: Soft, Nontender, Nondistended  EXTREMITIES:  Extremities warm  PSYCH: A&Ox3  SKIN: No obvious rashes or lesions

## 2024-11-25 NOTE — ED CDU PROVIDER INITIAL DAY NOTE - PROGRESS NOTE DETAILS
spoke with neuro, will come see pt. -BALAJI BrambilaC spoke with radiology, will have overnight neuro-radiologist look at MRI for read. spoke with Dr. Sigala, pt with optic neuritis. d/w neurology who is aware. neuro saw pt, concerned that pt has hx of itp/protein S deficiency, recommending eval by heme/onc. spoke with heme who states they are unaware of interactions of steroids and itp/protein S deficiency and will call resident directly. -Marisa David PA-C

## 2024-11-25 NOTE — CONSULT NOTE ADULT - SUBJECTIVE AND OBJECTIVE BOX
Neurology - Consult Note    -  Spectra: [phone] ([facility]), [phone] ([facility])  -    HPI: Patient LATIA MCCOLLUM is a 29y (1995) woman with a PMHx significant for history of ocular migraines that are different than current symptoms. Ocular migraines last 10-15 minutes and do not have a headache component. 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 (Vision loss began thursday)  Patient sent in by [facility] for disc swelling and blurry vision in the left eye. Neurology consulted for above.     Review of Systems:    CONSTITUTIONAL: No fevers or chills  EYES AND ENT: Left eye blurry vision  NECK: No pain or stiffness  RESPIRATORY: No hemoptysis or shortness of breath  CARDIOVASCULAR: No chest pain or palpitations  GASTROINTESTINAL: No melena or hematochezia  GENITOURINARY: No dysuria or hematuria  NEUROLOGICAL: +As stated in HPI above  SKIN: No itching, burning, rashes, or lesions   All other review of systems is negative unless indicated above.    Allergies:  No Known Allergies      PMHx/PSHx/Family Hx: As above, otherwise see below   No pertinent past medical history        Social Hx:  No current use of tobacco, alcohol, or illicit drugs  Lives with     Medications:  MEDICATIONS  (STANDING):    MEDICATIONS  (PRN):      Vitals:  T(C): 36.7 (11-25-24 @ 17:47), Max: 36.7 (11-25-24 @ 11:04)  HR: 76 (11-25-24 @ 17:47) (76 - 85)  BP: 130/73 (11-25-24 @ 17:47) (112/75 - 130/73)  RR: 18 (11-25-24 @ 17:47) (17 - 18)  SpO2: 99% (11-25-24 @ 17:47) (98% - 99%)    Physical Examination:   General - NAD  Cardiovascular - Peripheral pulses palpable, no edema    Neurological Examination:    - Mental Status: Eyes open, attends to examiner; oriented to person, age, month, year, location, and situation; speech is fluent with intact naming, intact repetition, and follows 1-step and 3-step mid-line crossing commands;     - Cranial Nerves:  II: Visual fields are full to confrontation; pupils are dilated post opthalmology eval   III, IV, VI: Extraocular movements are limited to pain on the left   V: Facial sensation is intact in the V1-V3 distribution bilaterally  VII: Face is symmetric with normal eye closure and smile  VIII: Hearing is intact to conversation  IX, X: Uvula is midline and soft palate rises symmetrically  XI: Shoulder shrug intact  XII: Tongue protrudes in the midline    - Motor/Strength Testing:  - No drifts x 4 extremities.                                   Right           Left  Deltoid                     5                 5  Biceps                      5                 5  Triceps                     5                 5  Wrist Ext (radial)       5                 5  Hand                  5                 5    Hip Flex                   5                  5  Knee Ext	      5                  5  Dorsiflex                  5                  5  Plantarflex               5                  5    - There is no pronator drift.   - Normal muscle bulk and tone throughout.    - Reflexes:   Bicep (C5/C6):                  R 2+ --- L 2+   Brachioradialis (C5/C6) :   R 2+ --- L 2+   Patella (L3/L4) :                 R 2+ --- L 2+   Ankle (S1) :                       R 2+ --- L 2+     - Plant responses down bilaterally.    - Sensory: Intact throughout to light touch x 4 extremities.  - Coordination: Finger-nose-finger intact without ataxia or dysmetria.    - Gait: Normal steps, base, arm swing, and turning.       Labs:                        12.8   4.85  )-----------( 208      ( 25 Nov 2024 12:29 )             38.5     11-25    139  |  104  |  13  ----------------------------<  86  4.1   |  22  |  0.61    Ca    9.4      25 Nov 2024 12:29    TPro  7.9  /  Alb  4.3  /  TBili  0.6  /  DBili  x   /  AST  17  /  ALT  21  /  AlkPhos  98  11-25    CAPILLARY BLOOD GLUCOSE        LIVER FUNCTIONS - ( 25 Nov 2024 12:29 )  Alb: 4.3 g/dL / Pro: 7.9 g/dL / ALK PHOS: 98 U/L / ALT: 21 U/L / AST: 17 U/L / GGT: x             PT/INR - ( 25 Nov 2024 12:29 )   PT: 11.2 sec;   INR: 0.97 ratio         PTT - ( 25 Nov 2024 12:29 )  PTT:31.9 sec  CSF:                  Radiology:  MR Head w/wo IV Cont:  (25 Nov 2024 17:40)  CT Head No Cont:  (25 Nov 2024 15:14)    Left eye pain, blurry vision, left  temporal hemorrhagic      Magnetic resonance imaging of the brain was carried out with transaxial   SPGR, FLAIR, fast spin echo T2 weighted images, axial susceptibility   weighted series, diffusion weighted series and sagittal T1 weighted   series on a 1.5 Hazel magnet. Post contrast axial, coronal and sagittal   T1 weighted images were obtained. Thin axial and coronal T1 and   T2-weighted series through the orbits were obtained.  7.5 cc cc of   Gadavist were intravenously injected, 0 cc were discarded.    Comparison is made with the prior CT/CTA of 11/25/2024.    The fourth, third and lateral ventricles are normal in size and position.   There is no hemorrhage, mass or shift of the midline structures. No   abnormal signal intensity is identified within the brain parenchyma on   the T1, T2 or FLAIR sequences. No acute infarcts are identified. There is   no old or new hemorrhage. The sellar and parasellar structures are   unremarkable. The paranasal sinuses are clear. Low signal intensity   involving the calvarial and cervical spine marrow is suggestive of   changes of anemia or increased red marrow.    Normal intracranial vascular enhancement is identified. No abnormal   parenchymal or leptomeningeal enhancement is identified.      Thin sections through the orbits demonstrates enhancement of the left   optic nerve within the orbit and extending through the optic canal with   optic neuritis. The globes, extraocular muscles are unremarkable. The   right orbit is normal.        IMPRESSION: Enhancement of the left optic nerve suggesting optic   neuritis. No white matter lesions are identified. No abnormal parenchymal   or leptomeningeal enhancement.

## 2024-11-25 NOTE — CONSULT NOTE ADULT - ASSESSMENT
29y female with a past medical history/ocular history of ITP, positive TIMMY, recurrent corneal erosions consulted for left eye blurry vision and pain found to have suspected optic neuritis.    #Suspected left optic neuritis  - VA 20/20 OD, 20/25 OS, possible trace APD OS, EOMs full with pain in the left eye, decreased color plates in the left eye with 90% red desat  - On exam, pt found to have grade 2-3 disc edema in the left eye, concerning for optic neuritis  - Recommend neurology evaluation  - recommend MRI brain and orbits w/w/o contrast  - Depending on MRI findings, may need 1g IV solumedrol for 3 days and LP per neurology  - recommend labs for CBC with diff, ESR, CRP, anti-AQP4 and anti-MOG antibodies, lyme ab, syphilis  - Ophthalmology to follow    Seen and discussed with Dr. Ferraro.    Outpatient Follow-up: Patient should follow-up with his/her ophthalmologist or with [facility] Department of Ophthalmology within 1 week of after discharge at:    [address]  [place] [address]  [phone]    Elbert Brock MD, PGY-3  Also available on [employer] Teams     29y female with a past medical history/ocular history of ITP, positive TIMMY, recurrent corneal erosions consulted for left eye blurry vision and pain found to have suspected optic neuritis.    #Suspected left optic neuritis  - VA 20/20 OD, 20/25 OS, possible trace APD OS, EOMs full with pain in the left eye, decreased color plates in the left eye with 90% red desat  - On exam, pt found to have grade 2-3 disc edema in the left eye, concerning for optic neuritis  - Recommend neurology evaluation  - recommend MRI brain and orbits w/w/o contrast  - Depending on MRI findings, may need 1g IV solumedrol for 3 days and LP per neurology  - recommend labs for CBC with diff, ESR, CRP, anti-AQP4 and anti-MOG antibodies, lyme ab, syphilis  - Ophthalmology to follow    Seen and discussed with Dr. Ferraro. Discussed with Dr. Fernandez, neuro-ophthalmology attending.    Outpatient Follow-up: Patient should follow-up with his/her ophthalmologist or with [facility] Department of Ophthalmology within 1 week of after discharge at:    [address]  [place] [address]  [phone]    Elbert Brock MD, PGY-3  Also available on [employer] Teams     29y female with a past medical history/ocular history of ITP, positive TIMMY, recurrent corneal erosions consulted for left eye blurry vision and pain found to have suspected optic neuritis.    #Suspected left optic neuritis  - VA 20/20 OD, 20/25 OS, possible trace APD OS, EOMs full with pain in the left eye, decreased color plates in the left eye with 90% red desat  - On exam, pt found to have grade 2-3 disc edema in the left eye, concerning for optic neuritis  - Recommend neurology evaluation  - MRI brain and orbits w/w/o contrast showed enhancement of the left optic nerve suggesting optic   neuritis. No white matter lesions are identified. No abnormal parenchymal or leptomeningeal enhancement.  - Recommend 1g IV solumedrol for 3 days and LP per neurology  - recommend labs for CBC with diff, ESR, CRP, anti-AQP4 and anti-MOG antibodies, lyme ab, syphilis  - Ophthalmology to follow    Seen and discussed with Dr. Ferraro. Discussed with Dr. Fernandez, neuro-ophthalmology attending.    Outpatient Follow-up: Patient should follow-up with his/her ophthalmologist or with [facility] Department of Ophthalmology within 1 week of after discharge at:    [address]  [place] [address]  [phone]    Elbert Brock MD, PGY-3  Also available on [employer] Teams

## 2024-11-25 NOTE — ED CDU PROVIDER INITIAL DAY NOTE - OBJECTIVE STATEMENT
29-year-old female patient past medical history ocular migraines presents to ED for left-sided persistent blurry vision and with associated left-sided headache at temporal region and x 5 days.  Patient this past Friday went to [facility], according to her she had normal eye pressures, with corneal erosion, left eye patch was placed and now resolved. papilledema was also found. Denies fevers, no chills, no body aches, no cough. Reports eye pain and a resolving sinus infection. Pt wears glasses and has not used her contacts in weeks.    In the ED, llabs and CT/CTV unremarkable. seen by ophthalmology who are concerned for optic neuritis recommending MRI and neuro.

## 2024-11-25 NOTE — ED PROVIDER NOTE - PHYSICAL EXAMINATION
GENERAL: NAD  HEENT:  Atraumatic, PEERLA, EOM intact, tenderness to left temporal region  CHEST/LUNG: Chest rise equal bilaterally, clear breath sounds b/l  HEART: Regular rate and rhythm  ABDOMEN: Soft, Nontender, Nondistended  EXTREMITIES:  Extremities warm  PSYCH: A&Ox3  SKIN: No obvious rashes or lesions GENERAL: NAD  HEENT:  Atraumatic, PEERLA, EOM intact, tenderness to left temporal region  Visual Acuity:  w/ corrective lenses  Left eye: 20/80   Right eye: 20/20  Both eyes: 20/20  CHEST/LUNG: Chest rise equal bilaterally, clear breath sounds b/l  HEART: Regular rate and rhythm  ABDOMEN: Soft, Nontender, Nondistended  EXTREMITIES:  Extremities warm  PSYCH: A&Ox3  SKIN: No obvious rashes or lesions

## 2024-11-25 NOTE — H&P ADULT - NSHPREVIEWOFSYSTEMS_GEN_ALL_CORE
CONSTITUTIONAL: No fevers or chills  EYES AND ENT: Left eye blurry vision  NECK: No pain or stiffness  RESPIRATORY: No hemoptysis or shortness of breath  CARDIOVASCULAR: No chest pain or palpitations  GASTROINTESTINAL: No melena or hematochezia  GENITOURINARY: No dysuria or hematuria  NEUROLOGICAL: +As stated in HPI above  SKIN: No itching, burning, rashes, or lesions   All other review of systems is negative unless indicated above.

## 2024-11-25 NOTE — H&P ADULT - HISTORY OF PRESENT ILLNESS
Patient LATIA MCCOLLUM is a 29y (1995) woman with a PMHx significant for history of ocular migraines that are different than current symptoms. Ocular migraines last 10-15 minutes and do not have a headache component. 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 (Vision loss began thursday)  Patient sent in by [facility] for disc swelling and blurry vision in the left eye. Neurology consulted for above.

## 2024-11-25 NOTE — ED ADULT NURSE REASSESSMENT NOTE - NS ED NURSE REASSESS COMMENT FT1
MRI done To CDU Awake alert and orientedx4 Resp even and nonlab Denies any discomfort. Opth consult done earlier Neuro to see pt.

## 2024-11-25 NOTE — ED ADULT NURSE REASSESSMENT NOTE - NS ED NURSE REASSESS COMMENT FT1
17.30 Received the Pt from  [facility] Pt is Observed for headache/Visual disturbances  for MRI . Received the Pt A&OX 4  Pt obeys commands Radha N/V/D fever chills cp SOB   Comfort care & safety measures continued  IV site looks clean & dry no signs of infiltration noted pt denies  pain IV site .Pt is oriented to the unit Plan of care explained .  Pt is advised to call for help  call bell with in the reach pt verbalized the understanding .  pending CDU  MD lugo . GCS 15/15 A&OX 4 Pupils medically dilated Strong upper & lower extremities steady gait  Pt is ambulatory & independent   No facial droop  No Hand Leg drop denies numbness tingling headache under control  due bloods sent   Plan of care ongoing

## 2024-11-25 NOTE — ED PROVIDER NOTE - ATTENDING CONTRIBUTION TO CARE
This is a 29-year-old female who is otherwise well home with the  now history of ocular migraines who is coming in with left eye blurriness.  She is associating this with left-sided temporal headache.  Headache was not sudden in onset.  There is no vomiting or fever or neck stiffness.  No trauma or injury.  She went to see ophthalmologist at [facility] and they found that she had corneal erosion which was on Friday and then she was patched and the erosion has resolved when she visited them again today however she has persistence of the vision blurriness in the left eye only as well as papilledema.  They were unable to get her seen by a neuro-ophthalmologist so they sent her here.  Patient is awake alert and in no acute distress.  Nontoxic in appearance.  No neck stiffness.  Mild tenderness to palpation at the temple on the left.  Full range of motion of the eyes.  3 mm reactive pupils.  Minimally injected conjunctiva on the left.  She is unable to see the numbers on the clock across the room but anything larger is visible.  there is no proptosis.  It is unclear the cause of the patient's symptoms.  For now we will start with a CT head as well as a CT venogram.  CBC CMP and inflammatory markers.  Consult ophthalmology.  Likely will require MR brain if no findings on noncontrast head.

## 2024-11-25 NOTE — ED CDU PROVIDER DISPOSITION NOTE - CLINICAL COURSE
29-year-old female patient past medical history ocular migraines presents to ED for left-sided persistent blurry vision and with associated left-sided headache at temporal region and x 5 days.  Patient this past Friday went to [facility], according to her she had normal eye pressures, with corneal erosion, left eye patch was placed and now resolved. papilledema was also found. Denies fevers, no chills, no body aches, no cough. Reports eye pain and a resolving sinus infection. Pt wears glasses and has not used her contacts in weeks.    In the ED, labs and CT/CTV unremarkable. seen by ophthalmology who are concerned for optic neuritis recommending MRI and neuro.    MRI showing optic neuritis, admitted to neuro service.

## 2024-11-25 NOTE — H&P ADULT - ATTENDING COMMENTS
Patient admitted for left-sided optic neuritis, papilledema and enhancement of optic nerve on the left.  Received first dose of Solu-Medrol early this morning  patient seen and examined at bedside.  Feels that eye pain is less than before vision is still blurry.  Has left-sided APD.  Visual acuity is 20/20 in the right eye and 20/70 in the left eye  Reviewed MRI cervical and thoracic spine with neuroradiology  For LP today  All questions were addressed and answered at bedside

## 2024-11-25 NOTE — CONSULT NOTE ADULT - SUBJECTIVE AND OBJECTIVE BOX
[facility] DEPARTMENT OF OPHTHALMOLOGY - INITIAL ADULT CONSULT  ----------------------------------------------------------------------------------------------------  Elbert Brock MD PGY-3  Available on teams  ----------------------------------------------------------------------------------------------------    HPI: 29-year-old female patient past medical history ocular migraines presents to ED for left-sided persistent blurry vision and with associated left-sided headache at temporal region and x 5 days.  Patient this past Friday went to [facility], according to her she had normal eye pressures, with corneal erosion, left eye patch was placed and now resolved. papilledema was also found. Denies fevers, no chills, no body aches, no cough. Reports eye pain and a resolving sinus infection. Pt wears glasses and has not used her contacts in weeks    Interval History: Pt sent in by [facility] for disc swelling and blurry vision in the left eye for the last few days as well as left eye pain    PAST MEDICAL & SURGICAL HISTORY:  No pertinent past medical history      H/O medial meniscus repair of right knee      History of repair of ACL        Past Ocular History: recurrent corneal erosions  Ophthalmic Medications: none  FAMILY HISTORY:    Social History: noncontributory    MEDICATIONS  (STANDING):    MEDICATIONS  (PRN):    Allergies & Intolerances:   No Known Allergies    Review of Systems:  Constitutional: No fever, chills  Eyes: blurry vision and eye pain OS  Neuro: No tremors  Cardiovascular: No chest pain, palpitations  Respiratory: No SOB, no cough  GI: No nausea, vomiting, abdominal pain  : No dysuria  Skin: no rash  Psych: no depression  Endocrine: no polyuria, polydipsia  Heme/lymph: no swelling    VITALS: T(C): 36.7 (11-25-24 @ 15:50)  T(F): 98.1 (11-25-24 @ 15:50), Max: 98.1 (11-25-24 @ 15:50)  HR: 77 (11-25-24 @ 15:50) (77 - 85)  BP: 112/75 (11-25-24 @ 15:50) (112/75 - 124/88)  RR:  (17 - 18)  SpO2:  (98% - 98%)  Wt(kg): --  General: AAO x 3, appropriate mood and affect    Ophthalmology Exam:  Visual acuity (cc): 20/20 OD, 20/25 OS  Pupils: possible trace APD OS  Ttono: 16 OD, 16 OS  Extraocular movements (EOMs): Full OU, +pain OS  Confrontational Visual Field (CVF): Full OU  Color Plates: 12/12 OD, 9/12 OS, 90% red desat    Pen Light Exam (PLE)  External: Flat OU  Lids/Lashes/Lacrimal Ducts: Flat OU    Sclera/Conjunctiva: W+Q OU  Cornea: Cl OU  Anterior Chamber: D+F OU    Iris: Flat OU  Lens: Cl OU    Fundus Exam: dilated with 1% tropicamide and 2.5% phenylephrine  Approval obtained from ED for dilation  Patient aware that pupils can remained dilated for at least 4-6 hours  Exam performed with 20D lens    Vitreous: wnl OU  Disc, cup/disc: sharp and pink, 0.4 OD, grade 2-3 disc edema OS  Macula: wnl OU  Vessels: wnl OU  Periphery: wnl OU    Labs/Imaging:  ***   [facility] DEPARTMENT OF OPHTHALMOLOGY - INITIAL ADULT CONSULT  ----------------------------------------------------------------------------------------------------  Elbert Brock MD PGY-3  Available on teams  ----------------------------------------------------------------------------------------------------    HPI: 29-year-old female patient past medical history ocular migraines presents to ED for left-sided persistent blurry vision and with associated left-sided headache at temporal region and x 5 days.  Patient this past Friday went to [facility], according to her she had normal eye pressures, with corneal erosion, left eye patch was placed and now resolved. papilledema was also found. Denies fevers, no chills, no body aches, no cough. Reports eye pain and a resolving sinus infection. Pt wears glasses and has not used her contacts in weeks    Interval History: Pt sent in by [facility] for disc swelling and blurry vision in the left eye for the last few days as well as left eye pain    PAST MEDICAL & SURGICAL HISTORY:  No pertinent past medical history      H/O medial meniscus repair of right knee      History of repair of ACL        Past Ocular History: recurrent corneal erosions  Ophthalmic Medications: none  FAMILY HISTORY:    Social History: noncontributory    MEDICATIONS  (STANDING):    MEDICATIONS  (PRN):    Allergies & Intolerances:   No Known Allergies    Review of Systems:  Constitutional: No fever, chills  Eyes: blurry vision and eye pain OS  Neuro: No tremors  Cardiovascular: No chest pain, palpitations  Respiratory: No SOB, no cough  GI: No nausea, vomiting, abdominal pain  : No dysuria  Skin: no rash  Psych: no depression  Endocrine: no polyuria, polydipsia  Heme/lymph: no swelling    VITALS: T(C): 36.7 (11-25-24 @ 15:50)  T(F): 98.1 (11-25-24 @ 15:50), Max: 98.1 (11-25-24 @ 15:50)  HR: 77 (11-25-24 @ 15:50) (77 - 85)  BP: 112/75 (11-25-24 @ 15:50) (112/75 - 124/88)  RR:  (17 - 18)  SpO2:  (98% - 98%)  Wt(kg): --  General: AAO x 3, appropriate mood and affect    Ophthalmology Exam:  Visual acuity (cc): 20/20 OD, 20/25 OS  Pupils: possible trace APD OS  Ttono: 16 OD, 16 OS  Extraocular movements (EOMs): Full OU, +pain OS  Confrontational Visual Field (CVF): Full OU  Color Plates: 12/12 OD, 9/12 OS, 90% red desat    Pen Light Exam (PLE)  External: Flat OU  Lids/Lashes/Lacrimal Ducts: Flat OU    Sclera/Conjunctiva: W+Q OU  Cornea: Cl OU  Anterior Chamber: D+F OU    Iris: Flat OU  Lens: Cl OU    Fundus Exam: dilated with 1% tropicamide and 2.5% phenylephrine  Approval obtained from ED for dilation  Patient aware that pupils can remained dilated for at least 4-6 hours  Exam performed with 20D lens    Vitreous: wnl OU  Disc, cup/disc: sharp and pink, 0.4 OD, grade 2-3 disc edema OS  Macula: wnl OU  Vessels: wnl OU  Periphery: wnl OU    Labs/Imaging:    ACC: [id number] EXAM:  MR ORBITS ONLY WAWI   ORDERED BY:  CNADIDO DINH     ACC: [id number] EXAM:  MR BRAIN WAW    ORDERED BY:  CANDIDO DINH     PROCEDURE DATE:  11/25/2024          INTERPRETATION:  CLINICAL INDICATION: Left eye pain, blurry vision, left  temporal hemorrhagic      Magnetic resonance imaging of the brain was carried out with transaxial   SPGR, FLAIR, fast spin echo T2 weighted images, axial susceptibility   weighted series, diffusion weighted series and sagittal T1 weighted   series on a 1.5 Hazel magnet. Post contrast axial, coronal and sagittal   T1 weighted images were obtained. Thin axial and coronal T1 and   T2-weighted series through the orbits were obtained.  7.5 cc cc of   Gadavist were intravenously injected, 0 cc were discarded.    Comparison is made with the prior CT/CTA of 11/25/2024.    The fourth, third and lateral ventricles are normal in size and position.   There is no hemorrhage, mass or shift of the midline structures. No   abnormal signal intensity is identified within the brain parenchyma on   the T1, T2 or FLAIR sequences. No acute infarcts are identified. There is   no old or new hemorrhage. The sellar and parasellar structures are   unremarkable. The paranasal sinuses are clear. Low signal intensity   involving the calvarial and cervical spine marrow is suggestive of   changes of anemia or increased red marrow.    Normal intracranial vascular enhancement is identified. No abnormal   parenchymal or leptomeningeal enhancement is identified.      Thin sections through the orbits demonstrates enhancement of the left   optic nerve within the orbit and extending through the optic canal with   optic neuritis. The globes, extraocular muscles are unremarkable. The   right orbit is normal.        IMPRESSION: Enhancement of the left optic nerve suggesting optic   neuritis. No white matter lesions are identified. No abnormal parenchymal   or leptomeningeal enhancement.    Dr. Velazquez discussed these findings with HOWIE David on 11/25/2024 7:47 PM   with read back.    --- End of Report ---            ALISON VELAZQUEZ MD; Attending Radiologist  This document has been electronically signed. Nov 25 2024  7:48PM

## 2024-11-25 NOTE — ED PROVIDER NOTE - CLINICAL SUMMARY MEDICAL DECISION MAKING FREE TEXT BOX
Patient is afebrile, well-appearing, hemodynamically stable, no nausea, no vomiting, no altered mental status.  Concern for venous thrombosis will get CT head Noncon and CT venogram to rule out.  Differentials include idiopathic intracranial hypertension versus temporal arteritis.  Will get basic labs, and inflammatory markers.  Ophthalmology consult pending.  Dispo CDU for brain MRI and neuro f/u.

## 2024-11-25 NOTE — ED ADULT NURSE REASSESSMENT NOTE - NS ED NURSE REASSESS COMMENT FT1
Pt received from [facility]. Pt oriented to CDU & plan of care was discussed. Pt A&O x 4. Independent. Pt in CDU for MRI, neuro checks, vital signs and Neurology following. Pt verbalized bilateral blurry vision, photosensitivity, and eye pain. V/S stable, pt afebrile,  IV in place, patent and free of signs of infiltration. Pt resting in bed. Safety & comfort measures maintained. Call bell in reach. Care continues.

## 2024-11-25 NOTE — ED PROVIDER NOTE - OBJECTIVE STATEMENT
29-year-old female patient past medical history ocular migraines presents to ED for left-sided persistent blurry vision and with associated left-sided headache at temporal region and x 5 days.  Patient this past Friday went to [facility], according to her she had normal eye pressures, with corneal erosion, left eye patch was placed and now resolved. papilledema was also found. Denies fevers, no chills, no body aches, no cough. Reports eye pain and a resolving sinus infection. Pt wears glasses and has not used her contacts in weeks.

## 2024-11-25 NOTE — ED ADULT NURSE NOTE - OBJECTIVE STATEMENT
29 yr old female amb to ED with H/O ocular Headaches c/o l eye blurriness. C/o l sided temp headache. Denies fever or chills Denies chest pain or SOB.

## 2024-11-26 LAB
ALBUMIN SERPL ELPH-MCNC: 4.2 G/DL — SIGNIFICANT CHANGE UP (ref 3.3–5)
ALP SERPL-CCNC: 110 U/L — SIGNIFICANT CHANGE UP (ref 40–120)
ALT FLD-CCNC: 18 U/L — SIGNIFICANT CHANGE UP (ref 10–45)
ANION GAP SERPL CALC-SCNC: 16 MMOL/L — SIGNIFICANT CHANGE UP (ref 5–17)
AST SERPL-CCNC: 15 U/L — SIGNIFICANT CHANGE UP (ref 10–40)
B BURGDOR C6 AB SER-ACNC: NEGATIVE — SIGNIFICANT CHANGE UP
B BURGDOR IGG+IGM SER-ACNC: 0.1 INDEX — SIGNIFICANT CHANGE UP (ref 0.01–0.9)
BILIRUB SERPL-MCNC: 0.7 MG/DL — SIGNIFICANT CHANGE UP (ref 0.2–1.2)
BUN SERPL-MCNC: 14 MG/DL — SIGNIFICANT CHANGE UP (ref 7–23)
CALCIUM SERPL-MCNC: 9.2 MG/DL — SIGNIFICANT CHANGE UP (ref 8.4–10.5)
CHLORIDE SERPL-SCNC: 103 MMOL/L — SIGNIFICANT CHANGE UP (ref 96–108)
CO2 SERPL-SCNC: 18 MMOL/L — LOW (ref 22–31)
CREAT SERPL-MCNC: 0.69 MG/DL — SIGNIFICANT CHANGE UP (ref 0.5–1.3)
EGFR: 120 ML/MIN/1.73M2 — SIGNIFICANT CHANGE UP
GLUCOSE SERPL-MCNC: 175 MG/DL — HIGH (ref 70–99)
HCG SERPL-ACNC: <2 MIU/ML — SIGNIFICANT CHANGE UP
HCG UR QL: NEGATIVE — SIGNIFICANT CHANGE UP
HCT VFR BLD CALC: 39.4 % — SIGNIFICANT CHANGE UP (ref 34.5–45)
HGB BLD-MCNC: 13.1 G/DL — SIGNIFICANT CHANGE UP (ref 11.5–15.5)
MCHC RBC-ENTMCNC: 29.2 PG — SIGNIFICANT CHANGE UP (ref 27–34)
MCHC RBC-ENTMCNC: 33.2 G/DL — SIGNIFICANT CHANGE UP (ref 32–36)
MCV RBC AUTO: 87.9 FL — SIGNIFICANT CHANGE UP (ref 80–100)
NRBC # BLD: 0 /100 WBCS — SIGNIFICANT CHANGE UP (ref 0–0)
PLATELET # BLD AUTO: 204 K/UL — SIGNIFICANT CHANGE UP (ref 150–400)
POTASSIUM SERPL-MCNC: 4.1 MMOL/L — SIGNIFICANT CHANGE UP (ref 3.5–5.3)
POTASSIUM SERPL-SCNC: 4.1 MMOL/L — SIGNIFICANT CHANGE UP (ref 3.5–5.3)
PROT SERPL-MCNC: 7.4 G/DL — SIGNIFICANT CHANGE UP (ref 6–8.3)
RBC # BLD: 4.48 M/UL — SIGNIFICANT CHANGE UP (ref 3.8–5.2)
RBC # FLD: 12 % — SIGNIFICANT CHANGE UP (ref 10.3–14.5)
SODIUM SERPL-SCNC: 137 MMOL/L — SIGNIFICANT CHANGE UP (ref 135–145)
T PALLIDUM AB TITR SER: NEGATIVE — SIGNIFICANT CHANGE UP
WBC # BLD: 6.74 K/UL — SIGNIFICANT CHANGE UP (ref 3.8–10.5)
WBC # FLD AUTO: 6.74 K/UL — SIGNIFICANT CHANGE UP (ref 3.8–10.5)

## 2024-11-26 PROCEDURE — 88189 FLOWCYTOMETRY/READ 16 & >: CPT

## 2024-11-26 PROCEDURE — 72156 MRI NECK SPINE W/O & W/DYE: CPT | Mod: 26

## 2024-11-26 PROCEDURE — 72157 MRI CHEST SPINE W/O & W/DYE: CPT | Mod: 26

## 2024-11-26 PROCEDURE — 99223 1ST HOSP IP/OBS HIGH 75: CPT

## 2024-11-26 RX ORDER — ACETAMINOPHEN 500MG 500 MG/1
650 TABLET, COATED ORAL EVERY 6 HOURS
Refills: 0 | Status: DISCONTINUED | OUTPATIENT
Start: 2024-11-26 | End: 2024-11-27

## 2024-11-26 RX ORDER — METHYLPREDNISOLONE SOD SUCC 125 MG
1000 VIAL (EA) INJECTION EVERY 24 HOURS
Refills: 0 | Status: DISCONTINUED | OUTPATIENT
Start: 2024-11-26 | End: 2024-11-27

## 2024-11-26 RX ORDER — PSYLLIUM SEED
1 POWDER (GRAM) ORAL AT BEDTIME
Refills: 0 | Status: DISCONTINUED | OUTPATIENT
Start: 2024-11-26 | End: 2024-11-27

## 2024-11-26 RX ORDER — LORAZEPAM 2 MG/1
0.5 TABLET ORAL ONCE
Refills: 0 | Status: DISCONTINUED | OUTPATIENT
Start: 2024-11-26 | End: 2024-11-26

## 2024-11-26 RX ADMIN — Medication 50 MILLIGRAM(S): at 01:34

## 2024-11-26 RX ADMIN — Medication 1 PACKET(S): at 21:26

## 2024-11-26 RX ADMIN — LORAZEPAM 0.5 MILLIGRAM(S): 2 TABLET ORAL at 15:47

## 2024-11-26 RX ADMIN — POVIDONE, POLYVINYL ALCOHOL 1 DROP(S): 20; 27 SOLUTION OPHTHALMIC at 13:38

## 2024-11-26 RX ADMIN — POVIDONE, POLYVINYL ALCOHOL 1 DROP(S): 20; 27 SOLUTION OPHTHALMIC at 00:26

## 2024-11-26 RX ADMIN — ACETAMINOPHEN 500MG 650 MILLIGRAM(S): 500 TABLET, COATED ORAL at 01:00

## 2024-11-26 RX ADMIN — ACETAMINOPHEN 500MG 650 MILLIGRAM(S): 500 TABLET, COATED ORAL at 00:26

## 2024-11-26 RX ADMIN — Medication 50 MILLIGRAM(S): at 21:29

## 2024-11-26 RX ADMIN — POVIDONE, POLYVINYL ALCOHOL 1 DROP(S): 20; 27 SOLUTION OPHTHALMIC at 05:20

## 2024-11-26 RX ADMIN — POVIDONE, POLYVINYL ALCOHOL 1 DROP(S): 20; 27 SOLUTION OPHTHALMIC at 23:16

## 2024-11-26 NOTE — PROGRESS NOTE ADULT - ASSESSMENT
29y female with a past medical history/ocular history of ITP, positive TIMMY, recurrent corneal erosions consulted for left eye blurry vision and pain found to have suspected optic neuritis.    #Suspected left optic neuritis  - Initial exam VA 20/20 OD, 20/25 OS, APD OS, EOMs full with pain in the left eye, decreased color plates in the left eye with 90% red desat  - On exam, pt found to have grade 2-3 disc edema in the left eye, concerning for optic neuritis  - Appreciate neurology recs  - MRI brain and orbits w/w/o contrast showed enhancement of the left optic nerve suggesting optic neuritis. No white matter lesions are identified. No abnormal parenchymal or leptomeningeal enhancement. No enhancement on spinal MRI  - Today after 1 dose of steroids, pt reports pain is improving however vision feels more blurry in the left eye  - Recommend c/w 1g IV solumedrol for 3 days and LP per neurology  - pending labs for anti-AQP4 and anti-MOG antibodies  - syphilis and lyme negative, ESR 19, CRP wnl  - Ophthalmology to follow    Discussed with Dr. Fernandez, neuro-ophthalmology attending.    Outpatient Follow-up: Patient should follow-up with his/her ophthalmologist or with [facility] Department of Ophthalmology within 1 week of after discharge at:    [address]  [place] [address]  [phone]    Elbert Brock MD, PGY-3  Also available on [employer] Teams

## 2024-11-26 NOTE — PROGRESS NOTE ADULT - SUBJECTIVE AND OBJECTIVE BOX
[facility] DEPARTMENT OF OPHTHALMOLOGY  ------------------------------------------------------------------------------  Elbert Brock MD PGY 3  Available on Teams  ------------------------------------------------------------------------------    Interval History: No acute events overnight. Today patient states vision OS feels more blurry but pain is improved.    MEDICATIONS  (STANDING):  artificial tears (preservative free) Ophthalmic Solution 1 Drop(s) Both EYES four times a day  methylPREDNISolone sodium succinate IVPB 1000 milliGRAM(s) IV Intermittent daily    MEDICATIONS  (PRN):  acetaminophen     Tablet .. 650 milliGRAM(s) Oral every 6 hours PRN Temp greater or equal to 38C (100.4F), Mild Pain (1 - 3), Moderate Pain (4 - 6), Severe Pain (7 - 10)      VITALS: T(C): 37.1 (11-26-24 @ 12:41)  T(F): 98.7 (11-26-24 @ 12:41), Max: 98.7 (11-26-24 @ 12:41)  HR: 105 (11-26-24 @ 12:41) (76 - 105)  BP: 114/89 (11-26-24 @ 12:41) (109/69 - 130/73)  RR:  (18 - 20)  SpO2:  (98% - 99%)  Wt(kg): --  General: AAO x 3, appropriate mood and affect    Ophthalmology Exam:  Visual acuity (cc): 20/20 OD, 20/70- PHNI OS  Pupils: +RAPD OS  Ttono: 16 OD, 16 OS  Extraocular movements (EOMs): Full OU, less pain, no diplopia  Confrontational Visual Field (CVF): Full OU  Color plates: 12/12 OU    Pen Light Exam (PLE)  External: Flat OU  Lids/Lashes/Lacrimal Ducts: Flat OU    Sclera/Conjunctiva: W+Q OU  Cornea: Cl OU  Anterior Chamber: D+F OU    Iris: Flat OU  Lens: Cl OU

## 2024-11-27 ENCOUNTER — TRANSCRIPTION ENCOUNTER (OUTPATIENT)
Age: 29
End: 2024-11-27

## 2024-11-27 ENCOUNTER — RESULT REVIEW (OUTPATIENT)
Age: 29
End: 2024-11-27

## 2024-11-27 VITALS
RESPIRATION RATE: 18 BRPM | HEART RATE: 86 BPM | SYSTOLIC BLOOD PRESSURE: 111 MMHG | OXYGEN SATURATION: 97 % | DIASTOLIC BLOOD PRESSURE: 72 MMHG | TEMPERATURE: 98 F

## 2024-11-27 LAB
A1C WITH ESTIMATED AVERAGE GLUCOSE RESULT: 5.5 % — SIGNIFICANT CHANGE UP (ref 4–5.6)
ANION GAP SERPL CALC-SCNC: 18 MMOL/L — HIGH (ref 5–17)
APPEARANCE CSF: CLEAR — SIGNIFICANT CHANGE UP
BASOPHILS # BLD AUTO: 0.03 K/UL — SIGNIFICANT CHANGE UP (ref 0–0.2)
BASOPHILS NFR BLD AUTO: 0.2 % — SIGNIFICANT CHANGE UP (ref 0–2)
BUN SERPL-MCNC: 14 MG/DL — SIGNIFICANT CHANGE UP (ref 7–23)
CALCIUM SERPL-MCNC: 10.2 MG/DL — SIGNIFICANT CHANGE UP (ref 8.4–10.5)
CHLORIDE SERPL-SCNC: 103 MMOL/L — SIGNIFICANT CHANGE UP (ref 96–108)
CO2 SERPL-SCNC: 19 MMOL/L — LOW (ref 22–31)
COLOR CSF: SIGNIFICANT CHANGE UP
CREAT SERPL-MCNC: 0.64 MG/DL — SIGNIFICANT CHANGE UP (ref 0.5–1.3)
CSF PCR RESULT: SIGNIFICANT CHANGE UP
EGFR: 123 ML/MIN/1.73M2 — SIGNIFICANT CHANGE UP
EOSINOPHIL # BLD AUTO: 0 K/UL — SIGNIFICANT CHANGE UP (ref 0–0.5)
EOSINOPHIL NFR BLD AUTO: 0 % — SIGNIFICANT CHANGE UP (ref 0–6)
ESTIMATED AVERAGE GLUCOSE: 111 MG/DL — SIGNIFICANT CHANGE UP (ref 68–114)
GLUCOSE BLDC GLUCOMTR-MCNC: 127 MG/DL — HIGH (ref 70–99)
GLUCOSE BLDC GLUCOMTR-MCNC: 129 MG/DL — HIGH (ref 70–99)
GLUCOSE CSF-MCNC: 113 MG/DL — HIGH (ref 40–70)
GLUCOSE SERPL-MCNC: 164 MG/DL — HIGH (ref 70–99)
GRAM STN FLD: SIGNIFICANT CHANGE UP
HCT VFR BLD CALC: 38.1 % — SIGNIFICANT CHANGE UP (ref 34.5–45)
HGB BLD-MCNC: 12.9 G/DL — SIGNIFICANT CHANGE UP (ref 11.5–15.5)
IMM GRANULOCYTES NFR BLD AUTO: 0.6 % — SIGNIFICANT CHANGE UP (ref 0–0.9)
LDH CSF L TO P-CCNC: <15 U/L — SIGNIFICANT CHANGE UP
LDH FLD-CCNC: <15 U/L — SIGNIFICANT CHANGE UP
LYMPHOCYTES # BLD AUTO: 0.58 K/UL — LOW (ref 1–3.3)
LYMPHOCYTES # BLD AUTO: 3.3 % — LOW (ref 13–44)
LYMPHOCYTES # CSF: 92 % — HIGH (ref 40–80)
MAGNESIUM SERPL-MCNC: 1.8 MG/DL — SIGNIFICANT CHANGE UP (ref 1.6–2.6)
MCHC RBC-ENTMCNC: 29.8 PG — SIGNIFICANT CHANGE UP (ref 27–34)
MCHC RBC-ENTMCNC: 33.9 G/DL — SIGNIFICANT CHANGE UP (ref 32–36)
MCV RBC AUTO: 88 FL — SIGNIFICANT CHANGE UP (ref 80–100)
MONOCYTES # BLD AUTO: 0.24 K/UL — SIGNIFICANT CHANGE UP (ref 0–0.9)
MONOCYTES NFR BLD AUTO: 1.3 % — LOW (ref 2–14)
MONOS+MACROS NFR CSF: 8 % — LOW (ref 15–45)
NEUTROPHILS # BLD AUTO: 16.87 K/UL — HIGH (ref 1.8–7.4)
NEUTROPHILS # CSF: 0 % — SIGNIFICANT CHANGE UP (ref 0–6)
NEUTROPHILS NFR BLD AUTO: 94.6 % — HIGH (ref 43–77)
NIGHT BLUE STAIN TISS: SIGNIFICANT CHANGE UP
NRBC # BLD: 0 /100 WBCS — SIGNIFICANT CHANGE UP (ref 0–0)
NRBC NFR CSF: 4 /UL — SIGNIFICANT CHANGE UP (ref 0–5)
PHOSPHATE SERPL-MCNC: 3.4 MG/DL — SIGNIFICANT CHANGE UP (ref 2.5–4.5)
PLATELET # BLD AUTO: 230 K/UL — SIGNIFICANT CHANGE UP (ref 150–400)
POTASSIUM SERPL-MCNC: 4.4 MMOL/L — SIGNIFICANT CHANGE UP (ref 3.5–5.3)
POTASSIUM SERPL-SCNC: 4.4 MMOL/L — SIGNIFICANT CHANGE UP (ref 3.5–5.3)
PROT CSF-MCNC: 25 MG/DL — SIGNIFICANT CHANGE UP (ref 15–45)
RBC # BLD: 4.33 M/UL — SIGNIFICANT CHANGE UP (ref 3.8–5.2)
RBC # CSF: 3 /UL — HIGH (ref 0–0)
RBC # FLD: 12.2 % — SIGNIFICANT CHANGE UP (ref 10.3–14.5)
SODIUM SERPL-SCNC: 140 MMOL/L — SIGNIFICANT CHANGE UP (ref 135–145)
SPECIMEN SOURCE: SIGNIFICANT CHANGE UP
SPECIMEN SOURCE: SIGNIFICANT CHANGE UP
TUBE TYPE: SIGNIFICANT CHANGE UP
WBC # BLD: 17.83 K/UL — HIGH (ref 3.8–10.5)
WBC # FLD AUTO: 17.83 K/UL — HIGH (ref 3.8–10.5)

## 2024-11-27 PROCEDURE — 84157 ASSAY OF PROTEIN OTHER: CPT

## 2024-11-27 PROCEDURE — 88184 FLOWCYTOMETRY/ TC 1 MARKER: CPT

## 2024-11-27 PROCEDURE — 62328 DX LMBR SPI PNXR W/FLUOR/CT: CPT

## 2024-11-27 PROCEDURE — 86403 PARTICLE AGGLUT ANTBDY SCRN: CPT

## 2024-11-27 PROCEDURE — 81025 URINE PREGNANCY TEST: CPT

## 2024-11-27 PROCEDURE — 88108 CYTOPATH CONCENTRATE TECH: CPT | Mod: 26

## 2024-11-27 PROCEDURE — 87116 MYCOBACTERIA CULTURE: CPT

## 2024-11-27 PROCEDURE — 72156 MRI NECK SPINE W/O & W/DYE: CPT | Mod: MC

## 2024-11-27 PROCEDURE — 85652 RBC SED RATE AUTOMATED: CPT

## 2024-11-27 PROCEDURE — 99239 HOSP IP/OBS DSCHRG MGMT >30: CPT

## 2024-11-27 PROCEDURE — 89051 BODY FLUID CELL COUNT: CPT

## 2024-11-27 PROCEDURE — ZZZZZ: CPT

## 2024-11-27 PROCEDURE — 36415 COLL VENOUS BLD VENIPUNCTURE: CPT

## 2024-11-27 PROCEDURE — 85730 THROMBOPLASTIN TIME PARTIAL: CPT

## 2024-11-27 PROCEDURE — 80053 COMPREHEN METABOLIC PANEL: CPT

## 2024-11-27 PROCEDURE — 70450 CT HEAD/BRAIN W/O DYE: CPT | Mod: MC

## 2024-11-27 PROCEDURE — 87385 HISTOPLASMA CAPSUL AG IA: CPT

## 2024-11-27 PROCEDURE — 83036 HEMOGLOBIN GLYCOSYLATED A1C: CPT

## 2024-11-27 PROCEDURE — 70543 MRI ORBT/FAC/NCK W/O &W/DYE: CPT | Mod: MC

## 2024-11-27 PROCEDURE — 82962 GLUCOSE BLOOD TEST: CPT

## 2024-11-27 PROCEDURE — 72157 MRI CHEST SPINE W/O & W/DYE: CPT | Mod: MC

## 2024-11-27 PROCEDURE — 87102 FUNGUS ISOLATION CULTURE: CPT

## 2024-11-27 PROCEDURE — 85025 COMPLETE CBC W/AUTO DIFF WBC: CPT

## 2024-11-27 PROCEDURE — 83916 OLIGOCLONAL BANDS: CPT

## 2024-11-27 PROCEDURE — 88108 CYTOPATH CONCENTRATE TECH: CPT

## 2024-11-27 PROCEDURE — 82784 ASSAY IGA/IGD/IGG/IGM EACH: CPT

## 2024-11-27 PROCEDURE — 85027 COMPLETE CBC AUTOMATED: CPT

## 2024-11-27 PROCEDURE — 70553 MRI BRAIN STEM W/O & W/DYE: CPT | Mod: MC

## 2024-11-27 PROCEDURE — 86788 WEST NILE VIRUS AB IGM: CPT

## 2024-11-27 PROCEDURE — 86255 FLUORESCENT ANTIBODY SCREEN: CPT

## 2024-11-27 PROCEDURE — 84100 ASSAY OF PHOSPHORUS: CPT

## 2024-11-27 PROCEDURE — 86053 AQAPRN-4 ANTB FLO CYTMTRY EA: CPT

## 2024-11-27 PROCEDURE — 97165 OT EVAL LOW COMPLEX 30 MIN: CPT

## 2024-11-27 PROCEDURE — 84702 CHORIONIC GONADOTROPIN TEST: CPT

## 2024-11-27 PROCEDURE — 99285 EMERGENCY DEPT VISIT HI MDM: CPT

## 2024-11-27 PROCEDURE — 82042 OTHER SOURCE ALBUMIN QUAN EA: CPT

## 2024-11-27 PROCEDURE — 82164 ANGIOTENSIN I ENZYME TEST: CPT

## 2024-11-27 PROCEDURE — 83873 ASSAY OF CSF PROTEIN: CPT

## 2024-11-27 PROCEDURE — 86140 C-REACTIVE PROTEIN: CPT

## 2024-11-27 PROCEDURE — 82945 GLUCOSE OTHER FLUID: CPT

## 2024-11-27 PROCEDURE — 86341 ISLET CELL ANTIBODY: CPT

## 2024-11-27 PROCEDURE — 88185 FLOWCYTOMETRY/TC ADD-ON: CPT

## 2024-11-27 PROCEDURE — 87070 CULTURE OTHR SPECIMN AEROBIC: CPT

## 2024-11-27 PROCEDURE — 83605 ASSAY OF LACTIC ACID: CPT

## 2024-11-27 PROCEDURE — 87015 SPECIMEN INFECT AGNT CONCNTJ: CPT

## 2024-11-27 PROCEDURE — 70496 CT ANGIOGRAPHY HEAD: CPT | Mod: MC

## 2024-11-27 PROCEDURE — 86789 WEST NILE VIRUS ANTIBODY: CPT

## 2024-11-27 PROCEDURE — A9585: CPT

## 2024-11-27 PROCEDURE — 83735 ASSAY OF MAGNESIUM: CPT

## 2024-11-27 PROCEDURE — 85610 PROTHROMBIN TIME: CPT

## 2024-11-27 PROCEDURE — 86617 LYME DISEASE ANTIBODY: CPT

## 2024-11-27 PROCEDURE — 80048 BASIC METABOLIC PNL TOTAL CA: CPT

## 2024-11-27 PROCEDURE — 86618 LYME DISEASE ANTIBODY: CPT

## 2024-11-27 PROCEDURE — 86362 MOG-IGG1 ANTB CBA EACH: CPT

## 2024-11-27 PROCEDURE — 87483 CNS DNA AMP PROBE TYPE 12-25: CPT

## 2024-11-27 PROCEDURE — 83615 LACTATE (LD) (LDH) ENZYME: CPT

## 2024-11-27 PROCEDURE — 82040 ASSAY OF SERUM ALBUMIN: CPT

## 2024-11-27 PROCEDURE — G0378: CPT

## 2024-11-27 PROCEDURE — 86780 TREPONEMA PALLIDUM: CPT

## 2024-11-27 PROCEDURE — 86592 SYPHILIS TEST NON-TREP QUAL: CPT

## 2024-11-27 PROCEDURE — 87205 SMEAR GRAM STAIN: CPT

## 2024-11-27 RX ORDER — PREDNISONE 20 MG/1
1 TABLET ORAL
Qty: 21 | Refills: 0
Start: 2024-11-27

## 2024-11-27 RX ORDER — 0.9 % SODIUM CHLORIDE 0.9 %
1000 INTRAVENOUS SOLUTION INTRAVENOUS
Refills: 0 | Status: DISCONTINUED | OUTPATIENT
Start: 2024-11-27 | End: 2024-11-27

## 2024-11-27 RX ORDER — GLUCAGON INJECTION, SOLUTION 0.5 MG/.1ML
1 INJECTION, SOLUTION SUBCUTANEOUS ONCE
Refills: 0 | Status: DISCONTINUED | OUTPATIENT
Start: 2024-11-27 | End: 2024-11-27

## 2024-11-27 RX ORDER — METHYLPREDNISOLONE SOD SUCC 125 MG
1000 VIAL (EA) INJECTION ONCE
Refills: 0 | Status: COMPLETED | OUTPATIENT
Start: 2024-11-27 | End: 2024-11-27

## 2024-11-27 RX ADMIN — POVIDONE, POLYVINYL ALCOHOL 1 DROP(S): 20; 27 SOLUTION OPHTHALMIC at 05:02

## 2024-11-27 RX ADMIN — POVIDONE, POLYVINYL ALCOHOL 1 DROP(S): 20; 27 SOLUTION OPHTHALMIC at 17:49

## 2024-11-27 RX ADMIN — POVIDONE, POLYVINYL ALCOHOL 1 DROP(S): 20; 27 SOLUTION OPHTHALMIC at 11:23

## 2024-11-27 RX ADMIN — Medication 50 MILLIGRAM(S): at 19:07

## 2024-11-27 NOTE — DISCHARGE NOTE PROVIDER - NSDCCPTREATMENT_GEN_ALL_CORE_FT
PRINCIPAL PROCEDURE  Procedure: MRI orbit and brain wo then w contrast  Findings and Treatment: IMPRESSION: Enhancement of the left optic nerve suggesting optic   neuritis. No white matter lesions are identified. No abnormal parenchymal   or leptomeningeal enhancement.      SECONDARY PROCEDURE  Procedure: MRI cervical and thoracic spine w contrast  Findings and Treatment: IMPRESSION:  1.  MR CERVICAL:   Cervical cord intact. No abnormal enhancement.  2.  MR THORACIC:   Suspect tiny cortical lesions in the mid thoracic   spine. No abnormal enhancement.

## 2024-11-27 NOTE — OCCUPATIONAL THERAPY INITIAL EVALUATION ADULT - PERTINENT HX OF CURRENT PROBLEM, REHAB EVAL
LATIA MCCOLLUM is a 29y (1995) woman with a PMHx significant for ITP, history of ocular migraines that are different than current symptoms (ocular migraines last 10-15 minutes and do not have a headache component. 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 (Vision loss began thursday) Patient sent in by [facility] for disc swelling and blurry vision in the left eye. Pt evaluated by neurology and ophtho in the ED, MRI Orbits showing L optic neuritis, pt admitted to general neurology for steroid treatment and further workup    Impression:  L eye vision loss and headache 2/2 L Optic Neuritis, under investigation for underlying etiology (MS, NMO, etc..). Improving with steroids.

## 2024-11-27 NOTE — PROGRESS NOTE ADULT - SUBJECTIVE AND OBJECTIVE BOX
[facility] DEPARTMENT OF OPHTHALMOLOGY  ------------------------------------------------------------------------------  Elbert Brock MD PGY 3  Available on Teams  ------------------------------------------------------------------------------    Interval History: No acute events overnight. Today patient's eye sx are improving.    MEDICATIONS  (STANDING):  artificial tears (preservative free) Ophthalmic Solution 1 Drop(s) Both EYES four times a day  dextrose 5%. 1000 milliLiter(s) (50 mL/Hr) IV Continuous <Continuous>  dextrose 5%. 1000 milliLiter(s) (100 mL/Hr) IV Continuous <Continuous>  dextrose 50% Injectable 25 Gram(s) IV Push once  dextrose 50% Injectable 12.5 Gram(s) IV Push once  dextrose 50% Injectable 25 Gram(s) IV Push once  glucagon  Injectable 1 milliGRAM(s) IntraMuscular once  insulin lispro (ADMELOG) corrective regimen sliding scale   SubCutaneous three times a day before meals  insulin lispro (ADMELOG) corrective regimen sliding scale   SubCutaneous at bedtime  methylPREDNISolone sodium succinate IVPB 1000 milliGRAM(s) IV Intermittent every 24 hours  psyllium Powder 1 Packet(s) Oral at bedtime    MEDICATIONS  (PRN):  acetaminophen     Tablet .. 650 milliGRAM(s) Oral every 6 hours PRN Temp greater or equal to 38C (100.4F), Mild Pain (1 - 3), Moderate Pain (4 - 6), Severe Pain (7 - 10)  dextrose Oral Gel 15 Gram(s) Oral once PRN Blood Glucose LESS THAN 70 milliGRAM(s)/deciliter      VITALS: T(C): 36.6 (11-27-24 @ 12:51)  T(F): 97.8 (11-27-24 @ 12:51), Max: 98.8 (11-27-24 @ 08:23)  HR: 106 (11-27-24 @ 12:51) (86 - 106)  BP: 134/78 (11-27-24 @ 12:51) (106/67 - 134/84)  RR:  (18 - 18)  SpO2:  (96% - 98%)  Wt(kg): --  General: AAO x 3, appropriate mood and affect    Ophthalmology Exam:  Visual acuity (cc): 20/20 OD, 20/25 OS  Pupils: APD OS  Ttono: 16 OD, 16 OS  Extraocular movements (EOMs): Full OU, no pain, no diplopia  Color plates: 12/12 OD/OS    Pen Light Exam (PLE)  External: Flat OU  Lids/Lashes/Lacrimal Ducts: Flat OU    Sclera/Conjunctiva: W+Q OU  Cornea: Cl OU  Anterior Chamber: D+F OU    Iris: Flat OU  Lens: Cl OU

## 2024-11-27 NOTE — PROGRESS NOTE ADULT - SUBJECTIVE AND OBJECTIVE BOX
Neurology Progress Note    SUBJECTIVE/OBJECTIVE/INTERVAL EVENTS: No acute events overnight.     MEDICATIONS  (STANDING):  artificial tears (preservative free) Ophthalmic Solution 1 Drop(s) Both EYES four times a day  methylPREDNISolone sodium succinate IVPB 1000 milliGRAM(s) IV Intermittent every 24 hours  psyllium Powder 1 Packet(s) Oral at bedtime    MEDICATIONS  (PRN):  acetaminophen     Tablet .. 650 milliGRAM(s) Oral every 6 hours PRN Temp greater or equal to 38C (100.4F), Mild Pain (1 - 3), Moderate Pain (4 - 6), Severe Pain (7 - 10)      VITALS & EXAMINATION:  Vital Signs Last 24 Hrs  T(C): 36.7 (27 Nov 2024 05:02), Max: 37.1 (26 Nov 2024 12:41)  T(F): 98.1 (27 Nov 2024 05:02), Max: 98.7 (26 Nov 2024 12:41)  HR: 100 (27 Nov 2024 05:02) (86 - 105)  BP: 106/67 (27 Nov 2024 05:02) (106/67 - 122/75)  BP(mean): --  RR: 18 (27 Nov 2024 05:02) (18 - 18)  SpO2: 98% (27 Nov 2024 05:02) (96% - 98%)    Parameters below as of 27 Nov 2024 05:02  Patient On (Oxygen Delivery Method): room air    Neuro exam:   MS: AAOx3, speech fluent  CN - R pupil fully reactively, L pupil minimally vs unreactive (hard to tell because of hippus), +L APD, +red desat left eye, visual acuity 20/20 OD, 20/70 OS. EOMI, V1-V3 sensation intact, no facial symmetry  Motor - 5/5 b/l UE and LE  Sensory - SILT b/l  Coordination - no dysmetria FTN b/l    LABORATORY:  CBC                       13.1   6.74  )-----------( 204      ( 26 Nov 2024 07:06 )             39.4     Chem 11-26    137  |  103  |  14  ----------------------------<  175[H]  4.1   |  18[L]  |  0.69    Ca    9.2      26 Nov 2024 07:11    TPro  7.4  /  Alb  4.2  /  TBili  0.7  /  DBili  x   /  AST  15  /  ALT  18  /  AlkPhos  110  11-26    LFTs LIVER FUNCTIONS - ( 26 Nov 2024 07:11 )  Alb: 4.2 g/dL / Pro: 7.4 g/dL / ALK PHOS: 110 U/L / ALT: 18 U/L / AST: 15 U/L / GGT: x           Coagulopathy PT/INR - ( 25 Nov 2024 12:29 )   PT: 11.2 sec;   INR: 0.97 ratio         PTT - ( 25 Nov 2024 12:29 )  PTT:31.9 sec  Lipid Panel   A1c   Cardiac enzymes     U/A Urinalysis Basic - ( 26 Nov 2024 07:11 )    Color: x / Appearance: x / SG: x / pH: x  Gluc: 175 mg/dL / Ketone: x  / Bili: x / Urobili: x   Blood: x / Protein: x / Nitrite: x   Leuk Esterase: x / RBC: x / WBC x   Sq Epi: x / Non Sq Epi: x / Bacteria: x      CSF  Immunological  Other    STUDIES & IMAGING: (EEG, CT, MR, U/S, TTE/MAYDA): Neurology Progress Note    SUBJECTIVE/OBJECTIVE/INTERVAL EVENTS: No acute events overnight. Pt continues to report improvement in symptoms.     MEDICATIONS  (STANDING):  artificial tears (preservative free) Ophthalmic Solution 1 Drop(s) Both EYES four times a day  methylPREDNISolone sodium succinate IVPB 1000 milliGRAM(s) IV Intermittent every 24 hours  psyllium Powder 1 Packet(s) Oral at bedtime    MEDICATIONS  (PRN):  acetaminophen     Tablet .. 650 milliGRAM(s) Oral every 6 hours PRN Temp greater or equal to 38C (100.4F), Mild Pain (1 - 3), Moderate Pain (4 - 6), Severe Pain (7 - 10)      VITALS & EXAMINATION:  Vital Signs Last 24 Hrs  T(C): 36.7 (27 Nov 2024 05:02), Max: 37.1 (26 Nov 2024 12:41)  T(F): 98.1 (27 Nov 2024 05:02), Max: 98.7 (26 Nov 2024 12:41)  HR: 100 (27 Nov 2024 05:02) (86 - 105)  BP: 106/67 (27 Nov 2024 05:02) (106/67 - 122/75)  BP(mean): --  RR: 18 (27 Nov 2024 05:02) (18 - 18)  SpO2: 98% (27 Nov 2024 05:02) (96% - 98%)    Parameters below as of 27 Nov 2024 05:02  Patient On (Oxygen Delivery Method): room air    Neuro exam:   MS: AAOx3, speech fluent  CN - R pupil fully reactively, L pupil minimally vs unreactive (hard to tell because of hippus), +L APD, +red desat left eye (but improved from before), visual acuity 20/20 OD, 20/40 OS. EOMI, V1-V3 sensation intact, no facial symmetry  Motor - 5/5 b/l UE and LE  Sensory - SILT b/l  Coordination - no dysmetria FTN b/l    LABORATORY:  CBC                       13.1   6.74  )-----------( 204      ( 26 Nov 2024 07:06 )             39.4     Chem 11-26    137  |  103  |  14  ----------------------------<  175[H]  4.1   |  18[L]  |  0.69    Ca    9.2      26 Nov 2024 07:11    TPro  7.4  /  Alb  4.2  /  TBili  0.7  /  DBili  x   /  AST  15  /  ALT  18  /  AlkPhos  110  11-26    LFTs LIVER FUNCTIONS - ( 26 Nov 2024 07:11 )  Alb: 4.2 g/dL / Pro: 7.4 g/dL / ALK PHOS: 110 U/L / ALT: 18 U/L / AST: 15 U/L / GGT: x           Coagulopathy PT/INR - ( 25 Nov 2024 12:29 )   PT: 11.2 sec;   INR: 0.97 ratio         PTT - ( 25 Nov 2024 12:29 )  PTT:31.9 sec  Lipid Panel   A1c   Cardiac enzymes     U/A Urinalysis Basic - ( 26 Nov 2024 07:11 )    Color: x / Appearance: x / SG: x / pH: x  Gluc: 175 mg/dL / Ketone: x  / Bili: x / Urobili: x   Blood: x / Protein: x / Nitrite: x   Leuk Esterase: x / RBC: x / WBC x   Sq Epi: x / Non Sq Epi: x / Bacteria: x      CSF  Immunological  Other    STUDIES & IMAGING: (EEG, CT, MR, U/S, TTE/MAYDA):

## 2024-11-27 NOTE — OCCUPATIONAL THERAPY INITIAL EVALUATION ADULT - BED MOBILITY/TRANSFERS, PREVIOUS LEVEL OF FUNCTION, OT EVAL
independent Airway patent, Nasal mucosa clear. Mouth with normal mucosa. Throat has no vesicles, no oropharyngeal exudates and uvula is midline.

## 2024-11-27 NOTE — DISCHARGE NOTE NURSING/CASE MANAGEMENT/SOCIAL WORK - FINANCIAL ASSISTANCE
[facility] provides services at a reduced cost to those who are determined to be eligible through [facility]’s financial assistance program. Information regarding [facility]’s financial assistance program can be found by going to [email or web address] or by calling [phone].

## 2024-11-27 NOTE — DISCHARGE NOTE NURSING/CASE MANAGEMENT/SOCIAL WORK - PATIENT PORTAL LINK FT
You can access the FollowMyHealth Patient Portal offered by [facility] by registering at the following website: [email or web address]. By joining [facility]’s FollowMyHealth portal, you will also be able to view your health information using other applications (apps) compatible with our system.

## 2024-11-27 NOTE — PROGRESS NOTE ADULT - ATTENDING COMMENTS
new onset L optic neuritis, s/p 2/3 doses of IV methylprednisolone, ye pain completely resolved, blurry vision improving, currently 20/30  OK to D/C home after 3rd dose, with steroid taper. will f/u outpatient with neurology and neuro ophthalmology

## 2024-11-27 NOTE — PROCEDURE NOTE - ADDITIONAL PROCEDURE DETAILS
PREPROCEDURE:    Patient presents for fluoroscopically guided lumbar puncture. Received patient on stretcher, alert and oriented x 4. Breathing on room air, spontaneously and unlabored. Risks and benefits were discussed with patient and/or health care proxy.  Risks include but are not limited to headache, bleeding, infection and nerve damage.    Patient and/or health care proxy understands and consents to procedure.    POSTPROCEDURE:    Lumbar puncture was performed at the L2-L3 level using a 20-gauge-spinal needle using fluoroscopic guidance. Opening pressure in the left lateral decubitus position was measured at 13 cm H2O. 24cc of clear spinal fluid was collected and hand delivered to the laboratory. Patient tolerated the procedure well and left the department in stable condition.    Attending: ALISON VELAZQUEZ MD
Lumbar Tap Procedure was aborted after Dry Tap. Pt tolerated procedure well and was allowed to lay flat for 1hr and recover.

## 2024-11-27 NOTE — DISCHARGE NOTE NURSING/CASE MANAGEMENT/SOCIAL WORK - NSDCPEFALRISK_GEN_ALL_CORE
For information on Fall & Injury Prevention, visit: [email or web address] OR  [email or web address] OR  https://www.cdc.gov/steadi/patient.html

## 2024-11-27 NOTE — OCCUPATIONAL THERAPY INITIAL EVALUATION ADULT - LEVEL OF INDEPENDENCE: SIT/SUPINE, REHAB EVAL
Procedure Date: 08/30/2018      PREOPERATIVE DIAGNOSIS:  Left ureteral stone.      POSTOPERATIVE DIAGNOSIS:  Left ureteral stone.      PROCEDURE:  Cystoscopy, left stent placement.      INDICATIONS:  Ms. Garcia is a 28-year-old woman with history of recurrent stone formation who recently presented with an obstructing left proximal 4 x 4 mm ureteral stone.  The patient was admitted for pain control.  After conservative attempts to pass the stone and to keep her pain under control, this morning the patient felt that her pain was persistent, and after discussion of risks, benefits and alternatives, elected to have a stent placed on the left side.      DESCRIPTION OF PROCEDURE:  After informed consent was obtained, the patient was brought to the operating room where she was administered MAC anesthesia.  A suitable level of anesthesia was attained.  She was placed in lithotomy position with all pressure points padded.  She was administered preoperative antibiotics.  She was prepped and draped in standard sterile fashion.  Next, the 22-Divehi cystoscope was introduced into the patient's bladder.  Inspection of the bladder revealed orthotopic UOs with efflux from UOs bilaterally.  There were no mucosal lesions, stones or foreign objects in the bladder.  Next, a sensor wire was used to intubate the left UO.  This was advanced up to the kidney under fluoroscopic guidance.  We then advanced a 6 x 26 double-J stent over the wire.  The proximal curl was confirmed by fluoroscopy, distal curl confirmed by direct vision.  The patient's bladder was drained.  She was awakened and brought to recovery room in stable condition.      SURGEON:  Hodan Ulrich MD.      ESTIMATED BLOOD LOSS:  None.      DRAINS:  Include left 6 x 26 double-J stent.      COMPLICATIONS:  There were no complications.         HODAN ULRICH MD             D: 08/30/2018   T: 08/30/2018   MT: SERA      Name:     SOMMER GARCIA   MRN:      [id number]         Account:        [id number]   :      1990           Procedure Date: 2018      Document: [id number]     independent

## 2024-11-27 NOTE — DISCHARGE NOTE PROVIDER - NSDCMRMEDTOKEN_GEN_ALL_CORE_FT
acetaminophen 325 mg oral tablet: 3 tab(s) orally every 6 hours  ibuprofen 600 mg oral tablet: 1 tab(s) orally every 6 hours   acetaminophen 325 mg oral tablet: 3 tab(s) orally every 6 hours  ibuprofen 600 mg oral tablet: 1 tab(s) orally every 6 hours  Occupational Therapy: Occupational Therapy  Outpatient Vision Therapy: Outpatient Vision Therapy  predniSONE 10 mg oral tablet: 1 tab(s) orally once a day Steroid taper (to start on 11/28):  Prednisone 60mg qd x 1 day  Prednisone 50mg qd x 1 day  Prednisone 40mg qd x 1 day  Prednisone 30mg qd x 1 day  Prednisone 20mg qd x 1 day  Prednisone 10mg qd x 1 day

## 2024-11-27 NOTE — DISCHARGE NOTE PROVIDER - NSDCCPCAREPLAN_GEN_ALL_CORE_FT
PRINCIPAL DISCHARGE DIAGNOSIS  Diagnosis: Optic neuritis  Assessment and Plan of Treatment: You came to the hospital because of L eye blurry vision and headache. We performed an MRI of the Brain and Orbits, which showed inflammation of the nerve in your left (Optic Neuritis), which explains your symptoms. We treated you with X days of steroids, after which your symptoms improved.  To look for other signs of inflammation, we obtained a MRI of the cervical and thoracic spine, which was normal. We also performed a Lumbar Puncture, which showed X.  After discharge, be sure to follow up outpatient with neuro-ophthalmology and neurology.     PRINCIPAL DISCHARGE DIAGNOSIS  Diagnosis: Optic neuritis  Assessment and Plan of Treatment: You came to the hospital because of L eye blurry vision and headache. We performed an MRI of the Brain and Orbits, which showed inflammation of the nerve in your left (Optic Neuritis), which explains your symptoms. We treated you with 3 days of steroids, after which your symptoms improved. We will be discharging you on a steroid taper as well - 60mg the day after discharge, then 50mg the following day, then 40, 30, etc... down to 0.  To look for other signs of inflammation, we obtained a MRI of the cervical and thoracic spine, which was normal. We also performed a Lumbar Puncture, which did not show evidence of significant inflammation (although certain spinal fluid studies are still awaiting results).  After discharge, be sure to follow up outpatient with neuro-ophthalmology and neurology.     PRINCIPAL DISCHARGE DIAGNOSIS  Diagnosis: Optic neuritis  Assessment and Plan of Treatment: You came to the hospital because of L eye blurry vision and headache. We performed an MRI of the Brain and Orbits, which showed inflammation of the nerve in your left (Optic Neuritis), which explains your symptoms. We treated you with 3 days of steroids, after which your symptoms improved. We will be discharging you on a steroid taper as well - 60mg the day after discharge, then 50mg the following day, then 40, 30, etc... down to 0.  To look for other signs of inflammation, we obtained a MRI of the cervical and thoracic spine, which did not show significant signs of inflammation. We also performed a Lumbar Puncture, which did not show evidence of significant inflammation (although certain spinal fluid studies are still awaiting results).  After discharge, be sure to follow up outpatient with neuro-ophthalmology and neurology.

## 2024-11-27 NOTE — DISCHARGE NOTE PROVIDER - HOSPITAL COURSE
HPI: Patient LATIA MCCOLLUM is a 29y (1995) woman with a PMHx significant for history of ocular migraines that are different than current symptoms. Ocular migraines last 10-15 minutes and do not have a headache component. 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 (Vision loss began thursday)  Patient sent in by [facility] for disc swelling and blurry vision in the left eye.    Hospital Course: Pt initially admitted to observation unit, where she obtained MRI Brain and Orbits, showing L Optic Neuritis. Pt subsequently admitted to general neurology for further workup and management. Pt received 3 doses of IV Solumedrol 1g qd, with improvement in her symptoms. Pt underwent MRI C and T spine, which was unremarkable. LP performed by neuroradiology on 11/27, showing X.    At time of discharge, pt is clinically stable. She should fo/u outpatient with neuro-ophthalmology and neurology.   HPI: Patient LATIA MCCOLLUM is a 29y (1995) woman with a PMHx significant for history of ocular migraines that are different than current symptoms. Ocular migraines last 10-15 minutes and do not have a headache component. 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 (Vision loss began thursday)  Patient sent in by [facility] for disc swelling and blurry vision in the left eye.    Hospital Course: Pt initially admitted to observation unit, where she obtained MRI Brain and Orbits, showing L Optic Neuritis. Pt subsequently admitted to general neurology for further workup and management. Pt received X doses of IV Solumedrol 1g qd, with improvement in her symptoms. Pt underwent MRI C and T spine, which was unremarkable. LP performed by neuroradiology on 11/27, showing Glucose 113, Protein 25, Nuc Cells X, still awaiting other CSF studies to result.    At time of discharge, pt is clinically stable. She should f/u outpatient with neuro-ophthalmology and neurology.   HPI: Patient LATIA MCCOLLUM is a 29y (1995) woman with a PMHx significant for history of ocular migraines that are different than current symptoms. Ocular migraines last 10-15 minutes and do not have a headache component. 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 (Vision loss began thursday)  Patient sent in by [facility] for disc swelling and blurry vision in the left eye.    Hospital Course: Pt initially admitted to observation unit, where she obtained MRI Brain and Orbits, showing L Optic Neuritis. Pt subsequently admitted to general neurology for further workup and management. Pt received 3 doses of IV Solumedrol 1g qd, with improvement in her symptoms. Pt underwent MRI C and T spine, which was unremarkable. LP performed by neuroradiology on 11/27, showing Glucose 113, Protein 25, Nuc Cells 4, still awaiting other CSF studies to result.    At time of discharge, pt is clinically stable. She will be discharged on a steroid taper (see below). She should f/u outpatient with neuro-ophthalmology, neurology, and Occupational Therapy/Vision Therapy    Steroid taper (to start on 11/28):  Prednisone 60mg qd x 1 day  Prednisone 50mg qd x 1 day  Prednisone 40mg qd x 1 day  Prednisone 30mg qd x 1 day  Prednisone 20mg qd x 1 day  Prednisone 10mg qd x 1 day HPI: Patient LATIA MCCOLLUM is a 29y (1995) woman with a PMHx significant for history of ocular migraines that are different than current symptoms. Ocular migraines last 10-15 minutes and do not have a headache component. 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 (Vision loss began thursday)  Patient sent in by [facility] for disc swelling and blurry vision in the left eye.    Hospital Course: Pt initially admitted to observation unit, where she obtained MRI Brain and Orbits, showing L Optic Neuritis. Pt subsequently admitted to general neurology for further workup and management. Pt received 3 doses of IV Solumedrol 1g qd, with improvement in her symptoms. Pt underwent MRI C and T spine, the latter of which showed tiny cortical hyperintensities of midthoracic spine (but no abnormal enhancement). LP performed by neuroradiology on 11/27, showing Glucose 113, Protein 25, Nuc Cells 4, still awaiting other CSF studies to result.    At time of discharge, pt is clinically stable. She will be discharged on a steroid taper (see below). She should f/u outpatient with neuro-ophthalmology, neurology, and Occupational Therapy/Vision Therapy    Steroid taper (to start on 11/28):  Prednisone 60mg qd x 1 day  Prednisone 50mg qd x 1 day  Prednisone 40mg qd x 1 day  Prednisone 30mg qd x 1 day  Prednisone 20mg qd x 1 day  Prednisone 10mg qd x 1 day

## 2024-11-27 NOTE — OCCUPATIONAL THERAPY INITIAL EVALUATION ADULT - ADDITIONAL COMMENTS
Pt lives with her spouse and  in a private home +3 steps to enter and one level inside; pt has a walk in shower; PTA pt was independent with ADLs/mobility with no AE/DME.

## 2024-11-27 NOTE — PROCEDURE NOTE - NSPROCDETAILS_GEN_ALL_CORE
location identified, draped/prepped, sterile technique used, needle inserted/introduced
location identified, draped/prepped, sterile technique used, needle inserted/introduced/procedure aborted/area cleaned in sterile fashion

## 2024-11-27 NOTE — PROGRESS NOTE ADULT - ASSESSMENT
LATIA MCCOLLUM is a 29y (1995) woman with a PMHx significant for ITP, history of ocular migraines that are different than current symptoms (ocular migraines last 10-15 minutes and do not have a headache component. 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 (Vision loss began thursday) Patient sent in by [facility] for disc swelling and blurry vision in the left eye. Pt evaluated by neurology and ophtho in the ED, MRI Orbits showing L optic neuritis, pt admitted to general neurology for steroid treatment and further workup    Impression:  L eye vision loss and headache 2/2 L Optic Neuritis, under investigation for underlying etiology (MS, NMO, etc..). Improving with steroids.    Plan:  [ ] IV Solumedrol 1g qd - plan for 3-5 day course, pending pt's clinical status (first dose 1 AM on 11/26)  [x] MR Orbits - L Optic Neuritis  [x] MR Brain, C, and T spine  - unremarkable  [ ] Attempted bedside LP on 11/26 but unsuccessful. Planning for neurorads LP on 11/27.  [x] Optho Recs appreciated  [ ] Per discussion with hematology fellow Dr. Boykin, no contraindication to Solumedrol  [] IV Solumedrol 1g x 3days  [] serum workup: anti-AQP4 and anti-MOG antibodies, ESR (19), CRP (<3), lyme ab (neg), syphilis (neg)  [] Neuro checks q4h  [] Diet: general  [] DVT ppx: Holding for LP  [] Dispo: home after completion of workup and steroids    Case to be seen by neuro attending on AM rounds. Recommendations not finalized until after attending attestation. LATIA MCCOLLUM is a 29y (1995) woman with a PMHx significant for ITP, history of ocular migraines that are different than current symptoms (ocular migraines last 10-15 minutes and do not have a headache component. 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 (Vision loss began thursday) Patient sent in by [facility] for disc swelling and blurry vision in the left eye. Pt evaluated by neurology and ophtho in the ED, MRI Orbits showing L optic neuritis, pt admitted to general neurology for steroid treatment and further workup    Impression:  L eye vision loss and headache 2/2 L Optic Neuritis, under investigation for underlying etiology (MS, NMO, etc..). Improving with steroids.    Plan:  [ ] IV Solumedrol 1g qd - plan for 3-5 day course, pending pt's clinical status (first dose 1 AM on 11/26, 2nd dose 9 PM on 11/26  [x] MR Orbits - L Optic Neuritis  [x] MR Brain, C, and T spine  - unremarkable  [ ] Attempted bedside LP on 11/26 but unsuccessful. Planning for neurorads LP on 11/27.  [x] Optho Recs appreciated  [ ] Per discussion with hematology fellow Dr. Boykin, no contraindication to Solumedrol  [] IV Solumedrol 1g x 3days  [] serum workup: anti-AQP4 and anti-MOG antibodies, ESR (19), CRP (<3), lyme ab (neg), syphilis (neg)  [] Neuro checks q4h  [] Diet: general  [] DVT ppx: Holding for LP  [] Dispo: home after completion of workup and steroids    Case to be seen by neuro attending on AM rounds. Recommendations not finalized until after attending attestation.

## 2024-11-27 NOTE — CHART NOTE - NSCHARTNOTEFT_GEN_A_CORE
Pt continues to endorse symptomatic improvement. On repeat visual acuity testing on 11/27 PM, OS 20/30 (compared to 20/40 on 11/27 AM, and 20/70 on 11/26 AM).     Plan to discharge patient home after 3rd dose of IV steroids, to be given at approximately 6 PM.     Pt will be discharged home with steroid taper (seen below), as well as occupational therapy/vision therapy. She will need neuro-ophthalmology and neuroimmunology follow up.     Pt agreeable to this plan. Discussed with neurology attending Dr. Rhodes.

## 2024-11-27 NOTE — DISCHARGE NOTE PROVIDER - CARE PROVIDERS DIRECT ADDRESSES
[email or web address]

## 2024-11-27 NOTE — PROCEDURE NOTE - NSSITEPREP_SKIN_A_CORE
povidone iodine (if allergic to chlorhexidine)
chlorhexidine/povidone iodine (if allergic to chlorhexidine)

## 2024-11-27 NOTE — DISCHARGE NOTE PROVIDER - CARE PROVIDER_API CALL
Damir Swain  Ophthalmology  [address] [place] [address], [address]  [place] [address]  Phone: [phone]  Fax: [phone]  Established Patient  Follow Up Time: 2 weeks    [facility]  Neurology  [address] [address]  Phone: [phone]  Fax: [phone]  Established Patient  Follow Up Time: 2 weeks

## 2024-11-27 NOTE — PROGRESS NOTE ADULT - ASSESSMENT
29y female with a past medical history/ocular history of ITP, positive TIMMY, recurrent corneal erosions consulted for left eye blurry vision and pain found to have suspected optic neuritis.    #Suspected left optic neuritis  - Initial exam VA 20/20 OD, 20/25 OS, APD OS, EOMs full with pain in the left eye, decreased color plates in the left eye with 90% red desat  - On exam, pt found to have grade 2-3 disc edema in the left eye, concerning for optic neuritis  - Appreciate neurology recs  - MRI brain and orbits w/w/o contrast showed enhancement of the left optic nerve suggesting optic neuritis. No white matter lesions are identified. No abnormal parenchymal or leptomeningeal enhancement. No enhancement on spinal MRI  - Today after 1 dose of steroids, pt reports pain is improving however vision feels more blurry in the left eye  - Recommend c/w 1g IV solumedrol for 3 days  - s/p LP on 11/26  - pending labs for anti-AQP4 and anti-MOG antibodies  - syphilis and lyme negative, ESR 19, CRP wnl  - Ophthalmology to follow    Discussed with Dr. Fernandez, neuro-ophthalmology attending.    Outpatient Follow-up: Patient should follow-up with his/her ophthalmologist or with [facility] Department of Ophthalmology within 1 week of after discharge at:    [address]  [place] [address]  [phone]    Elbert Brock MD, PGY-3  Also available on [employer] Teams 29y female with a past medical history/ocular history of ITP, positive TIMMY, recurrent corneal erosions consulted for left eye blurry vision and pain found to have suspected optic neuritis.    #Suspected left optic neuritis  - Initial exam VA 20/20 OD, 20/25 OS, APD OS, EOMs full with pain in the left eye, decreased color plates in the left eye with 90% red desat  - On exam, pt found to have grade 2-3 disc edema in the left eye, concerning for optic neuritis  - Appreciate neurology recs  - MRI brain and orbits w/w/o contrast showed enhancement of the left optic nerve suggesting optic neuritis. No white matter lesions are identified. No abnormal parenchymal or leptomeningeal enhancement. No enhancement on spinal MRI  - Today after 2 doses of steroids, vision is improving and pain is improving as well  - Recommend c/w 1g IV solumedrol for 3 days  - s/p LP on 11/26, follow up CSF studies  - pending labs for anti-AQP4 and anti-MOG antibodies  - syphilis and lyme negative, ESR 19, CRP wnl  - Ophthalmology to follow while pt is admitted  - Pt to follow up with Dr. Swain, address below after discharge    Discussed with Dr. Fernandez, neuro-ophthalmology attending.    Outpatient Follow-up: Patient should follow-up with his/her ophthalmologist or with [facility] Department of Ophthalmology within 1 week of after discharge at:    [address]  [place] [address]  [phone]    Elbert Brock MD, PGY-3  Also available on [employer] Teams

## 2024-11-27 NOTE — DISCHARGE NOTE PROVIDER - PROVIDER TOKENS
PROVIDER:[TOKEN:[[id number]],FOLLOWUP:[2 weeks],ESTABLISHEDPATIENT:[T]],PROVIDER:[TOKEN:[[id number]],FOLLOWUP:[2 weeks],ESTABLISHEDPATIENT:[T]]

## 2024-11-28 LAB — CRYPTOC AG CSF-ACNC: NEGATIVE — SIGNIFICANT CHANGE UP

## 2024-11-29 LAB
ALBUMIN CSF-MCNC: 15.4 MG/DL — SIGNIFICANT CHANGE UP (ref 14–25)
ALBUMIN SERPL ELPH-MCNC: 4267 MG/DL — SIGNIFICANT CHANGE UP (ref 3500–5200)
IGG CSF-MCNC: 2.4 MG/DL — SIGNIFICANT CHANGE UP
IGG FLD-MCNC: 1215 MG/DL — SIGNIFICANT CHANGE UP (ref 610–1660)
IGG SYNTH RATE SER+CSF CALC-MRATE: -2.5 MG/DAY — SIGNIFICANT CHANGE UP
IGG/ALB CLEAR SER+CSF-RTO: 0.5 — SIGNIFICANT CHANGE UP
IGG/ALB CSF: 0.16 RATIO — SIGNIFICANT CHANGE UP
IGG/ALB SER: 0.28 RATIO — SIGNIFICANT CHANGE UP
TM INTERPRETATION: SIGNIFICANT CHANGE UP

## 2024-12-01 LAB — AQP4 H2O CHANNEL AB SERPL IA-ACNC: REACTIVE

## 2024-12-02 ENCOUNTER — NON-APPOINTMENT (OUTPATIENT)
Age: 29
End: 2024-12-02

## 2024-12-02 ENCOUNTER — APPOINTMENT (OUTPATIENT)
Dept: OPHTHALMOLOGY | Facility: CLINIC | Age: 29
End: 2024-12-02
Payer: COMMERCIAL

## 2024-12-02 ENCOUNTER — APPOINTMENT (OUTPATIENT)
Dept: NEUROLOGY | Facility: CLINIC | Age: 29
End: 2024-12-02
Payer: COMMERCIAL

## 2024-12-02 VITALS
WEIGHT: 170 LBS | HEART RATE: 106 BPM | HEIGHT: 64 IN | BODY MASS INDEX: 29.02 KG/M2 | DIASTOLIC BLOOD PRESSURE: 77 MMHG | SYSTOLIC BLOOD PRESSURE: 113 MMHG | TEMPERATURE: 98.2 F

## 2024-12-02 DIAGNOSIS — H46.9 UNSPECIFIED OPTIC NEURITIS: ICD-10-CM

## 2024-12-02 LAB
CULTURE RESULTS: SIGNIFICANT CHANGE UP
INNER EAR 68KD AB FLD QL: <1.5 U/L — SIGNIFICANT CHANGE UP (ref 0–2.5)
NON-GYNECOLOGICAL CYTOLOGY STUDY: SIGNIFICANT CHANGE UP
SPECIMEN SOURCE: SIGNIFICANT CHANGE UP
VDRL CSF-TITR: SIGNIFICANT CHANGE UP
WNV IGG CSF IA-ACNC: NEGATIVE — SIGNIFICANT CHANGE UP
WNV IGM CSF IA-ACNC: NEGATIVE — SIGNIFICANT CHANGE UP

## 2024-12-02 PROCEDURE — 92133 CPTRZD OPH DX IMG PST SGM ON: CPT

## 2024-12-02 PROCEDURE — 92083 EXTENDED VISUAL FIELD XM: CPT

## 2024-12-02 PROCEDURE — 99496 TRANSJ CARE MGMT HIGH F2F 7D: CPT

## 2024-12-02 PROCEDURE — 99204 OFFICE O/P NEW MOD 45 MIN: CPT

## 2024-12-02 RX ORDER — PREDNISONE 20 MG/1
20 TABLET ORAL
Refills: 0 | Status: ACTIVE | COMMUNITY

## 2024-12-03 LAB
AMPA-R AB CBA, CSF: NEGATIVE — SIGNIFICANT CHANGE UP
AMPHIPHYSIN AB TITR CSF: NEGATIVE — SIGNIFICANT CHANGE UP
CASPR2-IGG CBA, CSF: NEGATIVE — SIGNIFICANT CHANGE UP
CV2 IGG TITR CSF: NEGATIVE — SIGNIFICANT CHANGE UP
GABA-B-R AB CBA, CSF: NEGATIVE — SIGNIFICANT CHANGE UP
GAD65 AB CSF-SCNC: 0 NMOL/L — SIGNIFICANT CHANGE UP
GFAP IFA, CSF: NEGATIVE — SIGNIFICANT CHANGE UP
GLIAL NUC TYPE 1 AB TITR CSF: NEGATIVE — SIGNIFICANT CHANGE UP
HU1 AB TITR CSF IF: NEGATIVE — SIGNIFICANT CHANGE UP
HU2 AB TITR CSF IF: NEGATIVE — SIGNIFICANT CHANGE UP
HU3 AB TITR CSF: NEGATIVE — SIGNIFICANT CHANGE UP
IFA NOTES: SIGNIFICANT CHANGE UP
IMMUNOLOGIST REVIEW: SIGNIFICANT CHANGE UP
LGI1-IGG CBA, CSF: NEGATIVE — SIGNIFICANT CHANGE UP
MBP CSF-MCNC: 1.7 NG/ML — SIGNIFICANT CHANGE UP (ref 0–2.9)
MGLUR1 AB IFA, CSF: NEGATIVE — SIGNIFICANT CHANGE UP
PCA-TR AB TITR CSF: NEGATIVE — SIGNIFICANT CHANGE UP
PURKINJE CELL CYTOPLASMIC AB TYPE 2: NEGATIVE — SIGNIFICANT CHANGE UP
PURKINJE CELLS AB TITR CSF IF: NEGATIVE — SIGNIFICANT CHANGE UP

## 2024-12-04 LAB — NFL CHAIN SERPL IA-MCNC: 3 PG/ML

## 2024-12-05 LAB
MOG AB CSF QL CBA IFA: SIGNIFICANT CHANGE UP
MOG AB SER QL CBA IFA: NEGATIVE — SIGNIFICANT CHANGE UP
OLIGOCLONAL BANDS CSF ELPH-IMP: SIGNIFICANT CHANGE UP

## 2024-12-06 LAB
H CAPSUL AG CSF IA-MCNC: SIGNIFICANT CHANGE UP
LYME IGG LINE BLOT INTERP.: NEGATIVE — SIGNIFICANT CHANGE UP
LYME IGM LINE BLOT INTERP.: NEGATIVE — SIGNIFICANT CHANGE UP
P18 AB. IGG: SIGNIFICANT CHANGE UP
P23 AB. IGG: SIGNIFICANT CHANGE UP
P23 AB. IGM: SIGNIFICANT CHANGE UP
P28 AB. IGG: SIGNIFICANT CHANGE UP
P30 AB. IGG: SIGNIFICANT CHANGE UP
P39 AB. IGG: SIGNIFICANT CHANGE UP
P39 AB. IGM: SIGNIFICANT CHANGE UP
P41 AB. IGG: SIGNIFICANT CHANGE UP
P41 AB. IGM: SIGNIFICANT CHANGE UP
P45 AB. IGG: SIGNIFICANT CHANGE UP
P58 AB. IGG: SIGNIFICANT CHANGE UP
P66 AB. IGG: SIGNIFICANT CHANGE UP
P93 AB. IGG: SIGNIFICANT CHANGE UP

## 2024-12-08 ENCOUNTER — INPATIENT (INPATIENT)
Facility: HOSPITAL | Age: 29
LOS: 0 days | Discharge: HOME | DRG: 125 | End: 2024-12-09
Attending: STUDENT IN AN ORGANIZED HEALTH CARE EDUCATION/TRAINING PROGRAM | Admitting: STUDENT IN AN ORGANIZED HEALTH CARE EDUCATION/TRAINING PROGRAM
Payer: COMMERCIAL

## 2024-12-08 VITALS
WEIGHT: 169.98 LBS | OXYGEN SATURATION: 96 % | DIASTOLIC BLOOD PRESSURE: 70 MMHG | TEMPERATURE: 98 F | HEART RATE: 107 BPM | HEIGHT: 64 IN | SYSTOLIC BLOOD PRESSURE: 110 MMHG | RESPIRATION RATE: 19 BRPM

## 2024-12-08 DIAGNOSIS — Z98.890 OTHER SPECIFIED POSTPROCEDURAL STATES: Chronic | ICD-10-CM

## 2024-12-08 DIAGNOSIS — H54.7 UNSPECIFIED VISUAL LOSS: ICD-10-CM

## 2024-12-08 LAB
ALBUMIN SERPL ELPH-MCNC: 4.4 G/DL — SIGNIFICANT CHANGE UP (ref 3.3–5)
ALP SERPL-CCNC: 88 U/L — SIGNIFICANT CHANGE UP (ref 40–120)
ALT FLD-CCNC: 31 U/L — SIGNIFICANT CHANGE UP (ref 10–45)
ANION GAP SERPL CALC-SCNC: 13 MMOL/L — SIGNIFICANT CHANGE UP (ref 5–17)
AST SERPL-CCNC: 15 U/L — SIGNIFICANT CHANGE UP (ref 10–40)
BASOPHILS # BLD AUTO: 0 K/UL — SIGNIFICANT CHANGE UP (ref 0–0.2)
BASOPHILS NFR BLD AUTO: 0 % — SIGNIFICANT CHANGE UP (ref 0–2)
BILIRUB SERPL-MCNC: 0.8 MG/DL — SIGNIFICANT CHANGE UP (ref 0.2–1.2)
BUN SERPL-MCNC: 13 MG/DL — SIGNIFICANT CHANGE UP (ref 7–23)
CALCIUM SERPL-MCNC: 9.7 MG/DL — SIGNIFICANT CHANGE UP (ref 8.4–10.5)
CHLORIDE SERPL-SCNC: 103 MMOL/L — SIGNIFICANT CHANGE UP (ref 96–108)
CO2 SERPL-SCNC: 23 MMOL/L — SIGNIFICANT CHANGE UP (ref 22–31)
CREAT SERPL-MCNC: 0.73 MG/DL — SIGNIFICANT CHANGE UP (ref 0.5–1.3)
CRP SERPL-MCNC: <3 MG/L — SIGNIFICANT CHANGE UP (ref 0–4)
EGFR: 114 ML/MIN/1.73M2 — SIGNIFICANT CHANGE UP
EOSINOPHIL # BLD AUTO: 0 K/UL — SIGNIFICANT CHANGE UP (ref 0–0.5)
EOSINOPHIL NFR BLD AUTO: 0 % — SIGNIFICANT CHANGE UP (ref 0–6)
ERYTHROCYTE [SEDIMENTATION RATE] IN BLOOD: 20 MM/HR — HIGH (ref 0–15)
GLUCOSE SERPL-MCNC: 151 MG/DL — HIGH (ref 70–99)
HCT VFR BLD CALC: 40.3 % — SIGNIFICANT CHANGE UP (ref 34.5–45)
HGB BLD-MCNC: 13.3 G/DL — SIGNIFICANT CHANGE UP (ref 11.5–15.5)
LYMPHOCYTES # BLD AUTO: 0.71 K/UL — LOW (ref 1–3.3)
LYMPHOCYTES # BLD AUTO: 4.4 % — LOW (ref 13–44)
MANUAL SMEAR VERIFICATION: SIGNIFICANT CHANGE UP
MCHC RBC-ENTMCNC: 29.2 PG — SIGNIFICANT CHANGE UP (ref 27–34)
MCHC RBC-ENTMCNC: 33 G/DL — SIGNIFICANT CHANGE UP (ref 32–36)
MCV RBC AUTO: 88.6 FL — SIGNIFICANT CHANGE UP (ref 80–100)
MONOCYTES # BLD AUTO: 0 K/UL — SIGNIFICANT CHANGE UP (ref 0–0.9)
MONOCYTES NFR BLD AUTO: 0 % — LOW (ref 2–14)
NEUTROPHILS # BLD AUTO: 15.49 K/UL — HIGH (ref 1.8–7.4)
NEUTROPHILS NFR BLD AUTO: 94.7 % — HIGH (ref 43–77)
NEUTS BAND # BLD: 0.9 % — SIGNIFICANT CHANGE UP (ref 0–8)
OVALOCYTES BLD QL SMEAR: SLIGHT — SIGNIFICANT CHANGE UP
PLAT MORPH BLD: NORMAL — SIGNIFICANT CHANGE UP
PLATELET # BLD AUTO: 334 K/UL — SIGNIFICANT CHANGE UP (ref 150–400)
POIKILOCYTOSIS BLD QL AUTO: SLIGHT — SIGNIFICANT CHANGE UP
POTASSIUM SERPL-MCNC: 4.2 MMOL/L — SIGNIFICANT CHANGE UP (ref 3.5–5.3)
POTASSIUM SERPL-SCNC: 4.2 MMOL/L — SIGNIFICANT CHANGE UP (ref 3.5–5.3)
PROT SERPL-MCNC: 7.4 G/DL — SIGNIFICANT CHANGE UP (ref 6–8.3)
RBC # BLD: 4.55 M/UL — SIGNIFICANT CHANGE UP (ref 3.8–5.2)
RBC # FLD: 12.5 % — SIGNIFICANT CHANGE UP (ref 10.3–14.5)
RBC BLD AUTO: ABNORMAL
SODIUM SERPL-SCNC: 139 MMOL/L — SIGNIFICANT CHANGE UP (ref 135–145)
WBC # BLD: 16.2 K/UL — HIGH (ref 3.8–10.5)
WBC # FLD AUTO: 16.2 K/UL — HIGH (ref 3.8–10.5)

## 2024-12-08 PROCEDURE — 99285 EMERGENCY DEPT VISIT HI MDM: CPT

## 2024-12-08 RX ORDER — METHYLPREDNISOLONE SOD SUCC 125 MG
1000 VIAL (EA) INJECTION DAILY
Refills: 0 | Status: DISCONTINUED | OUTPATIENT
Start: 2024-12-09 | End: 2024-12-09

## 2024-12-08 RX ORDER — ENOXAPARIN SODIUM 30 MG/.3ML
40 INJECTION SUBCUTANEOUS EVERY 24 HOURS
Refills: 0 | Status: DISCONTINUED | OUTPATIENT
Start: 2024-12-08 | End: 2024-12-09

## 2024-12-08 RX ORDER — POVIDONE, POLYVINYL ALCOHOL 20; 27 G/1000ML; G/1000ML
1 SOLUTION OPHTHALMIC THREE TIMES A DAY
Refills: 0 | Status: DISCONTINUED | OUTPATIENT
Start: 2024-12-08 | End: 2024-12-09

## 2024-12-08 RX ORDER — ACETAMINOPHEN 500MG 500 MG/1
650 TABLET, COATED ORAL EVERY 6 HOURS
Refills: 0 | Status: DISCONTINUED | OUTPATIENT
Start: 2024-12-08 | End: 2024-12-09

## 2024-12-08 RX ORDER — METHYLPREDNISOLONE SOD SUCC 125 MG
1000 VIAL (EA) INJECTION ONCE
Refills: 0 | Status: COMPLETED | OUTPATIENT
Start: 2024-12-08 | End: 2024-12-08

## 2024-12-08 RX ADMIN — Medication 100 MILLIGRAM(S): at 17:44

## 2024-12-08 NOTE — ED PROVIDER NOTE - PHYSICAL EXAMINATION
Eye: Visual acuity: 20/20 right eye, 20/400 left eye.  Extraocular motion intact without pain.  No tenderness palpation around eye.  No swelling around eyes.  Pupils equally round and reactive to light.

## 2024-12-08 NOTE — ED ADULT NURSE NOTE - OBJECTIVE STATEMENT
Pt is a 30 yo F w/ PMHx of Devic's disease complaining of l/ eye vision loss. Pt states she recently received a lumbar puncture due to onset of vision changes and was diagnosed w/ optic neuritis. Pt reports MD notified her to visit the ED if s/s progress; pt endorse blurry vision has significantly increased. Pt further reports minor headache to left side of head. Pt denies any vision changes to r/ eye. Pt is aox4, airway clear and patent, equal chest rise and fall, PERRL, pulses intact, skin warm and dry, motor and sensory skills intact. Pt denies LOC, SOB, chest pain, D/N/V, or any other discomforts. Pt placed in locked lowered stretcher w/ rails raised.

## 2024-12-08 NOTE — H&P ADULT - ASSESSMENT
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 She reports on discharge, her vision was 20/40 in her left eye, and remained stable until yesterday. She noticed mild worsening of her vision yesterday, but did not think much of it. Today her vision acutely worsened, and she reports seeing only shadows and loss of peripheral vision in her left eye with pain on eye movement. She believes it is worsen than her first episode. She has not seen a neuroimmunologist outpatient yet and has not been started on maintenance medication. She has an appoint with neuroloimmunology at [facility] on Tuesday.     Impression: Acute L vision loss likely due to acute optic neuritis from NMO.     Plan:  [] MRI Brain and orbits w/wo contrast  [] Continue solumedrol 1g QD for 3-5 days    Discussed with Dr. Penaloza. Note and plan not finalized until attending attestation and signature.

## 2024-12-08 NOTE — H&P ADULT - NSHPPHYSICALEXAM_GEN_ALL_CORE
Physical Examination: INCOMPLETE  General - NAD, pleasant, cooperative   Cardiovascular - Peripheral pulses palpable, no edema  Neurologic Exam:  Mental status - Awake, Alert, Oriented to person, place, and time. Speech fluent, repetition and naming intact. Follows simple and complex commands. Attention/concentration, recent and remote memory (including registration and recall), and fund of knowledge intact    Cranial nerves:  CN II: Visual fields are full to confrontation. Bilateral pupils reactive to light, with relative APD in L eye. Visual acuity 20/400 in left eye.   CN V: Facial sensation is intact to pinprick in all 3 divisions bilaterally.  CN VII: Face is symmetric with normal eye closure and smile.  CN VII: Hearing is normal to rubbing fingers  CN IX, X: Palate elevates symmetrically. Phonation is normal.  CN XI: Head turning and shoulder shrug are intact  CN XII: Tongue is midline with normal movements and no atrophy.    Motor - Normal bulk and tone throughout. No pronator drift of out-stretched arms.  Strength testing            Deltoid(C5)  Biceps(C6)    Triceps(C7)     Wrist Extension    Wrist Flexion (C8)     Interossei  (T1)       R            5                 5                        5                     5                              5                                      5                         5  L             5                 5                        5                     5                              5                                      5                          5              Hip Flexion(L2/3)    Hip Extension (L4/5)   Knee Flexion (L4/5/S1)    Knee Extension (L3/4)   Dorsiflexion (L4/5)   Plantar Flexion (S1)  R              5                                    5                                     5                                                     5                                              5                          5  L              5                                     5                                     5                                                     5                                              5                          5    Sensation - Light touch intact throughout    DTR's -             Biceps      Triceps     Brachioradialis      Patellar    Ankle    Toes/plantar response  R             2+             2+                  2+                       2+            2+                 Down  L              2+             2+                 2+                        2+           2+                 Down    Coordination - Rapid alternating movements and fine finger movements are intact. There is no dysmetria on finger-to-nose and heel-knee-shin.     Gait and station - Posture is normal. Gait is steady with normal steps.

## 2024-12-08 NOTE — ED PROVIDER NOTE - CLINICAL SUMMARY MEDICAL DECISION MAKING FREE TEXT BOX
29-year-old female with past medical history of NMO diagnosed 2 weeks ago who was discharged with 20/40 vision of the left eye on prednisone 5 mg daily presenting due to acute worsening of left eye vision that started yesterday.  Patient states her vision is now 20/400 as she does Snellen test at home and she has mild headache.  Patient was told to come to emergency department if symptoms worsen.  No pain behind the eye.  No dizziness, weakness numbness to extremities, loss consciousness, altered mental status.    Patient afebrile and hemodynamically stable.  AOx3.  Extraocular motion intact without pain.  Pupils equally reactive to light.  Right eye is 20/20, left eye is 20/400.  Symptoms concerning for acute worsening of NMO.  Will give patient 1000 mg of methylprednisolone and consult neurology.

## 2024-12-08 NOTE — H&P ADULT - ATTENDING COMMENTS
30F with newly diagnosed NMO last month when admitted for optic neuritis, was discharged with steroid taper, who returns for worsening of her vision. She reports that since her discharge she has been under extreme stress as she lost her job and is having home stressors. Her vision worsened and was similar to how it prior to last admission, but no new symptoms. She was admitted and now s/p 2 days of solumedrol.    MRI Brain/orbits without any enhancement and the patient has an appointment with neuro-immunology tomorrow at [facility].     Exam  General:  Healthy appearing.    Mental Status:  A&Ox3.    Cranial Nerves: left lower lateral quadrant clouding, PERRL, EOMI, normal sensation bilaterally, no facial weakness, hearing intact, palate midline, SCM normal, tongue protrudes to midline.    Motor:  Normal tone, bulk and power throughout including arms and legs, proximal and distal. No abnormal movements.    Sensation: Normal in arms and legs to soft    Reflexes: 2+ bilateral upper and lower extremities.    Coordination: No dysmetria.    Gait: Normal.    Imp: 30F with recently diagnosed NMO who returns for some worsening of her left optic neuritis. Symptoms likely pseudoflare in setting of extreme stress as no enhancement on MRI. We dicussed that the most important thing for her at the moment is to establish care with outpatient neuroimmunology. Will discharge today with prednisone 1250mg for her to take tomorrow so that she can make her appointment tomorrow.    - S/p two days solumedrol 1g daily  - Prednisone 1250mg tomorrow  - D/c home today

## 2024-12-08 NOTE — ED PROVIDER NOTE - ATTENDING CONTRIBUTION TO CARE
Emergency Medicine Attending MD Rosas:  patient seen and evaluated with the resident.  I was present for key portions of the History & Physical, and I agree with the Impression & Plan.    Patient is a 29-year-old female, hospitalized in late November for left eye optic neuritis (confirmed on MRI, ophthalmologic exam, lumbar puncture), hospitalized for high-dose Solu-Medrol; discharged 11/27/2024.    When she was discharged, her visual acuity was 20/40, now her visual acuity is 2080 in the left eye.  Right eye is unaffected.  Patient is still taking prednisone 20 mg daily.  Of note, patient is currently breast-feeding,    Requesting a room to pump.    VS: wnl  Gen: Well appearing adult female in NAD  Head: NC/AT.  Pupils equal round reactive to light, extraocular movement is intact.  Neck: trachea midline  Resp:  No distress  CV: RRR, no RMG  Abd: nondistended  Ext: no deformities  Neuro:  A&Ox4 appears non focal  Skin:  Warm and dry as visualized  Psych: appropriate    Medical Decision Making / Differential Diagnosis:  HPI concerning for recurrent worsening optic neuritis despite p.o. steroids.  Patient will need to be readmitted for high-dose IV methylprednisone 1 g.  Neurology consulted.

## 2024-12-08 NOTE — H&P ADULT - HISTORY OF PRESENT ILLNESS
29 yo female with PMH of recently diagnosed NMO confirmed on CSF study, discharged from [facility] on 11/27 for L optic neuritis. She reports on discharge, her vision was 20/40 in her left eye, and remained stable until yesterday. She noticed mild worsening of her vision yesterday, but did not think much of it. Today her vision acutely worsened, and she reports seeing only shadows and loss of peripheral vision in her left eye with pain on eye movement. She believes it is worsen than her first episode. She has not seen a neuroimmunologist outpatient yet and has not been started on maintenance medication. She has an appoint with neuroloimmunology at [facility] on Tuesday.  31 yo female with PMH of recently diagnosed NMO confirmed on CSF study, discharged from [facility] on 11/27 for L optic neuritis. She reports on discharge, her vision was 20/40 in her left eye, and remained stable until yesterday. She noticed mild worsening of her vision yesterday, but did not think much of it. Today her vision acutely worsened, and she reports seeing only shadows and loss of peripheral vision in her left eye with pain on eye movement. She believes it is worsen than her first episode. Denies weakness, numbness, paresthesias, speech changes, dizziness. She has not seen a neuroimmunologist outpatient yet and has not been started on maintenance medication. She has an appoint with neuroloimmunology at [facility] on Tuesday.

## 2024-12-09 ENCOUNTER — TRANSCRIPTION ENCOUNTER (OUTPATIENT)
Age: 29
End: 2024-12-09

## 2024-12-09 VITALS
SYSTOLIC BLOOD PRESSURE: 123 MMHG | RESPIRATION RATE: 19 BRPM | TEMPERATURE: 98 F | DIASTOLIC BLOOD PRESSURE: 78 MMHG | OXYGEN SATURATION: 98 % | HEART RATE: 100 BPM

## 2024-12-09 LAB
ALBUMIN SERPL ELPH-MCNC: 4.1 G/DL — SIGNIFICANT CHANGE UP (ref 3.3–5)
ALP SERPL-CCNC: 83 U/L — SIGNIFICANT CHANGE UP (ref 40–120)
ALT FLD-CCNC: 25 U/L — SIGNIFICANT CHANGE UP (ref 10–45)
ANION GAP SERPL CALC-SCNC: 15 MMOL/L — SIGNIFICANT CHANGE UP (ref 5–17)
AST SERPL-CCNC: 14 U/L — SIGNIFICANT CHANGE UP (ref 10–40)
BILIRUB SERPL-MCNC: 0.6 MG/DL — SIGNIFICANT CHANGE UP (ref 0.2–1.2)
BUN SERPL-MCNC: 13 MG/DL — SIGNIFICANT CHANGE UP (ref 7–23)
CALCIUM SERPL-MCNC: 9.8 MG/DL — SIGNIFICANT CHANGE UP (ref 8.4–10.5)
CHLORIDE SERPL-SCNC: 106 MMOL/L — SIGNIFICANT CHANGE UP (ref 96–108)
CO2 SERPL-SCNC: 19 MMOL/L — LOW (ref 22–31)
CREAT SERPL-MCNC: 0.54 MG/DL — SIGNIFICANT CHANGE UP (ref 0.5–1.3)
EGFR: 128 ML/MIN/1.73M2 — SIGNIFICANT CHANGE UP
GLUCOSE SERPL-MCNC: 174 MG/DL — HIGH (ref 70–99)
HCT VFR BLD CALC: 39.5 % — SIGNIFICANT CHANGE UP (ref 34.5–45)
HGB BLD-MCNC: 13.1 G/DL — SIGNIFICANT CHANGE UP (ref 11.5–15.5)
MCHC RBC-ENTMCNC: 29.2 PG — SIGNIFICANT CHANGE UP (ref 27–34)
MCHC RBC-ENTMCNC: 33.2 G/DL — SIGNIFICANT CHANGE UP (ref 32–36)
MCV RBC AUTO: 88 FL — SIGNIFICANT CHANGE UP (ref 80–100)
NRBC # BLD: 0 /100 WBCS — SIGNIFICANT CHANGE UP (ref 0–0)
PLATELET # BLD AUTO: 332 K/UL — SIGNIFICANT CHANGE UP (ref 150–400)
POTASSIUM SERPL-MCNC: 3.8 MMOL/L — SIGNIFICANT CHANGE UP (ref 3.5–5.3)
POTASSIUM SERPL-SCNC: 3.8 MMOL/L — SIGNIFICANT CHANGE UP (ref 3.5–5.3)
PROT SERPL-MCNC: 7 G/DL — SIGNIFICANT CHANGE UP (ref 6–8.3)
RBC # BLD: 4.49 M/UL — SIGNIFICANT CHANGE UP (ref 3.8–5.2)
RBC # FLD: 12.4 % — SIGNIFICANT CHANGE UP (ref 10.3–14.5)
SODIUM SERPL-SCNC: 140 MMOL/L — SIGNIFICANT CHANGE UP (ref 135–145)
WBC # BLD: 16.97 K/UL — HIGH (ref 3.8–10.5)
WBC # FLD AUTO: 16.97 K/UL — HIGH (ref 3.8–10.5)

## 2024-12-09 PROCEDURE — 85025 COMPLETE CBC W/AUTO DIFF WBC: CPT

## 2024-12-09 PROCEDURE — 86140 C-REACTIVE PROTEIN: CPT

## 2024-12-09 PROCEDURE — 99223 1ST HOSP IP/OBS HIGH 75: CPT | Mod: GC

## 2024-12-09 PROCEDURE — 99285 EMERGENCY DEPT VISIT HI MDM: CPT | Mod: 25

## 2024-12-09 PROCEDURE — 96374 THER/PROPH/DIAG INJ IV PUSH: CPT

## 2024-12-09 PROCEDURE — 80053 COMPREHEN METABOLIC PANEL: CPT

## 2024-12-09 PROCEDURE — 70543 MRI ORBT/FAC/NCK W/O &W/DYE: CPT | Mod: MC

## 2024-12-09 PROCEDURE — 70553 MRI BRAIN STEM W/O & W/DYE: CPT | Mod: 26

## 2024-12-09 PROCEDURE — A9585: CPT

## 2024-12-09 PROCEDURE — 70543 MRI ORBT/FAC/NCK W/O &W/DYE: CPT | Mod: 26

## 2024-12-09 PROCEDURE — 85027 COMPLETE CBC AUTOMATED: CPT

## 2024-12-09 PROCEDURE — 85652 RBC SED RATE AUTOMATED: CPT

## 2024-12-09 PROCEDURE — 70553 MRI BRAIN STEM W/O & W/DYE: CPT | Mod: MC

## 2024-12-09 RX ORDER — PREDNISONE 20 MG/1
1250 TABLET ORAL
Qty: 1250 | Refills: 0
Start: 2024-12-09 | End: 2024-12-09

## 2024-12-09 RX ORDER — SODIUM CHLORIDE 50 MG/G
2 OINTMENT OPHTHALMIC EVERY 12 HOURS
Refills: 0 | Status: DISCONTINUED | OUTPATIENT
Start: 2024-12-09 | End: 2024-12-09

## 2024-12-09 RX ORDER — PREDNISONE 20 MG/1
1250 TABLET ORAL ONCE
Refills: 0 | Status: DISCONTINUED | OUTPATIENT
Start: 2024-12-10 | End: 2024-12-09

## 2024-12-09 RX ORDER — SODIUM CHLORIDE 50 MG/G
2 OINTMENT OPHTHALMIC
Qty: 0 | Refills: 0 | DISCHARGE
Start: 2024-12-09

## 2024-12-09 RX ADMIN — Medication 50 MILLIGRAM(S): at 05:31

## 2024-12-09 RX ADMIN — POVIDONE, POLYVINYL ALCOHOL 1 DROP(S): 20; 27 SOLUTION OPHTHALMIC at 00:02

## 2024-12-09 NOTE — DISCHARGE NOTE NURSING/CASE MANAGEMENT/SOCIAL WORK - NSDCPEFALRISK_GEN_ALL_CORE
For information on Fall & Injury Prevention, visit: [email or web address] OR  [email or web address] OR  https://www.cdc.gov/steadi/patient.html

## 2024-12-09 NOTE — DISCHARGE NOTE PROVIDER - HOSPITAL COURSE
12/9/2024  Noted some improvement to vision. Improved from shadows to seeing whites, more colors, and better appreciation of movement. Patient has neuroimmunologist appt in [facility] tomorrow at 1200. Patient stable for DC. Plan for DC today w pulse dose steroids    12/8: worsening of vision. Patient admitted to hospital for steroid regimen and repeat MRI.   MRI brain and orbits wwo contrast was negative for any acute changes, formal read was some improvement to previously noted edema 12/9/2024  Noted some improvement to vision. Improved from shadows to seeing whites, more colors, and better appreciation of movement. Patient has neuroimmunologist appt in [facility] tomorrow at 1200. Patient stable for DC. Plan for DC today w instruction to take prednisone 1250mg tomorrow and discuss with neuroimmunology at [facility] about further tapering    12/8: worsening of vision. Patient admitted to hospital for steroid regimen and repeat MRI.   MRI brain and orbits wwo contrast was negative for any acute changes, formal read was some improvement to previously noted edema

## 2024-12-09 NOTE — DISCHARGE NOTE PROVIDER - NSDCMRMEDTOKEN_GEN_ALL_CORE_FT
acetaminophen 325 mg oral tablet: 3 tab(s) orally every 6 hours  ibuprofen 600 mg oral tablet: 1 tab(s) orally every 6 hours  Occupational Therapy: Occupational Therapy  Outpatient Vision Therapy: Outpatient Vision Therapy  predniSONE 10 mg oral tablet: 1 tab(s) orally once a day Steroid taper (to start on 11/28):  Prednisone 60mg qd x 1 day  Prednisone 50mg qd x 1 day  Prednisone 40mg qd x 1 day  Prednisone 30mg qd x 1 day  Prednisone 20mg qd x 1 day  Prednisone 10mg qd x 1 day   acetaminophen 325 mg oral tablet: 3 tab(s) orally every 6 hours  Occupational Therapy: Occupational Therapy  Outpatient Vision Therapy: Outpatient Vision Therapy  predniSONE 10 mg oral tablet: 1 tab(s) orally once a day Steroid taper (to start on 11/28):  Prednisone 60mg qd x 1 day  Prednisone 50mg qd x 1 day  Prednisone 40mg qd x 1 day  Prednisone 30mg qd x 1 day  Prednisone 20mg qd x 1 day  Prednisone 10mg qd x 1 day  sodium chloride, hypertonic 5% ophthalmic solution: 2 drop(s) to each affected eye every 12 hours As needed corneal edema   acetaminophen 325 mg oral tablet: 3 tab(s) orally every 6 hours  Occupational Therapy: Occupational Therapy  Outpatient Vision Therapy: Outpatient Vision Therapy  predniSONE 10 mg oral tablet: 1,250 tab(s) orally once a day 1250 mg of prednisone ONCE  sodium chloride, hypertonic 5% ophthalmic solution: 2 drop(s) to each affected eye every 12 hours As needed corneal edema   acetaminophen 325 mg oral tablet: 3 tab(s) orally every 6 hours  Occupational Therapy: Occupational Therapy  Outpatient Vision Therapy: Outpatient Vision Therapy  predniSONE 50 mg oral tablet: 1,250 milligram(s) orally once a day Take 1250 mg prednisone ONCE on 12/10/2024  sodium chloride, hypertonic 5% ophthalmic solution: 2 drop(s) to each affected eye every 12 hours As needed corneal edema

## 2024-12-09 NOTE — PATIENT PROFILE ADULT - CENTRAL VENOUS CATHETER/PICC LINE
no
Right ear hearing screen completed date: 2022  Right ear screen method: EOAE (evoked otoacoustic emission)  Right ear screen result: Passed  Right ear screen comment: N/A    Left ear hearing screen completed date: 2022  Left ear screen method: EOAE (evoked otoacoustic emission)  Left ear screen result: Passed  Left ear screen comments: N/A

## 2024-12-09 NOTE — DISCHARGE NOTE PROVIDER - ATTENDING ATTESTATION STATEMENT
Bed: G14  Expected date: 2/10/22  Expected time: 1:58 PM  Means of arrival: [facility] ([id number])  Comments:  89 F Fall x2, most recent today, unable to stand after fall  Denies LOC. Left ankle skin tear.  202/97 82 18 100% RA   Hx DM, Htn   I have personally seen and examined the patient. I have collaborated with and supervised the

## 2024-12-09 NOTE — DISCHARGE NOTE PROVIDER - ATTENDING DISCHARGE PHYSICAL EXAMINATION:
General:  Healthy appearing.    Mental Status:  A&Ox3.    Cranial Nerves: left lower lateral quadrant clouding, PERRL, EOMI, normal sensation bilaterally, no facial weakness, hearing intact, palate midline, SCM normal, tongue protrudes to midline.    Motor:  Normal tone, bulk and power throughout including arms and legs, proximal and distal. No abnormal movements.    Sensation: Normal in arms and legs to soft    Reflexes: 2+ bilateral upper and lower extremities.    Coordination: No dysmetria.    Gait: Normal.

## 2024-12-09 NOTE — DISCHARGE NOTE PROVIDER - NSDCFUSCHEDAPPT_GEN_ALL_CORE_FT
Home Seay  [facility] Physician [facility]  NEUROLOGY [address]  Scheduled Appointment: 12/18/2024    Damir Swain  [facility]  OPHTHALM [address]  Scheduled Appointment: 02/03/2025

## 2024-12-09 NOTE — DISCHARGE NOTE NURSING/CASE MANAGEMENT/SOCIAL WORK - FINANCIAL ASSISTANCE
[facility] provides services at a reduced cost to those who are determined to be eligible through [facility]’s financial assistance program. Information regarding [facility]’s financial assistance program can be found by going to [email or web address] or by calling [phone].

## 2024-12-09 NOTE — DISCHARGE NOTE PROVIDER - NSDCCPCAREPLAN_GEN_ALL_CORE_FT
PRINCIPAL DISCHARGE DIAGNOSIS  Diagnosis: Vision loss  Assessment and Plan of Treatment:       SECONDARY DISCHARGE DIAGNOSES  Diagnosis: Neuromyelitis optica  Assessment and Plan of Treatment:

## 2024-12-09 NOTE — DISCHARGE NOTE NURSING/CASE MANAGEMENT/SOCIAL WORK - PATIENT PORTAL LINK FT
You can access the FollowMyHealth Patient Portal offered by [facility] by registering at the following website: [email or web address]. By joining [employer]’s FollowMyHealth portal, you will also be able to view your health information using other applications (apps) compatible with our system.

## 2024-12-10 RX ORDER — PREDNISONE 20 MG/1
1250 TABLET ORAL
Qty: 25 | Refills: 0
Start: 2024-12-10

## 2024-12-12 ENCOUNTER — TRANSCRIPTION ENCOUNTER (OUTPATIENT)
Age: 29
End: 2024-12-12

## 2024-12-18 ENCOUNTER — APPOINTMENT (OUTPATIENT)
Dept: NEUROLOGY | Facility: CLINIC | Age: 29
End: 2024-12-18
Payer: COMMERCIAL

## 2024-12-18 ENCOUNTER — NON-APPOINTMENT (OUTPATIENT)
Age: 29
End: 2024-12-18

## 2024-12-18 VITALS
BODY MASS INDEX: 29.71 KG/M2 | HEIGHT: 64 IN | SYSTOLIC BLOOD PRESSURE: 130 MMHG | HEART RATE: 112 BPM | TEMPERATURE: 98.4 F | DIASTOLIC BLOOD PRESSURE: 77 MMHG | OXYGEN SATURATION: 97 % | WEIGHT: 174 LBS

## 2024-12-18 DIAGNOSIS — G36.0 NEUROMYELITIS OPTICA [DEVIC]: ICD-10-CM

## 2024-12-18 PROCEDURE — 99215 OFFICE O/P EST HI 40 MIN: CPT

## 2024-12-18 PROCEDURE — G2211 COMPLEX E/M VISIT ADD ON: CPT | Mod: NC

## 2024-12-18 PROCEDURE — 99417 PROLNG OP E/M EACH 15 MIN: CPT

## 2024-12-18 RX ORDER — INEBILIZUMAB 10 MG/ML
100 INJECTION INTRAVENOUS
Qty: 2 | Refills: 1 | Status: ACTIVE | COMMUNITY
Start: 2024-12-18 | End: 1900-01-01

## 2024-12-24 ENCOUNTER — TRANSCRIPTION ENCOUNTER (OUTPATIENT)
Age: 29
End: 2024-12-24

## 2024-12-24 LAB
BASOPHILS # BLD AUTO: 0.08 K/UL
BASOPHILS NFR BLD AUTO: 0.7 %
CD16+CD56+ CELLS # BLD: 60 CELLS/UL
CD16+CD56+ CELLS NFR BLD: 11 %
CD19 CELLS NFR BLD: 187 CELLS/UL
CD3 CELLS # BLD: 312 CELLS/UL
CD3 CELLS NFR BLD: 54 %
CD3+CD4+ CELLS # BLD: 170 CELLS/UL
CD3+CD4+ CELLS NFR BLD: 28 %
CD3+CD4+ CELLS/CD3+CD8+ CLL SPEC: 1.17 RATIO
CD3+CD8+ CELLS # SPEC: 145 CELLS/UL
CD3+CD8+ CELLS NFR BLD: 24 %
CELLS.CD3-CD19+/CELLS IN BLOOD: 34 %
EOSINOPHIL # BLD AUTO: 0.02 K/UL
EOSINOPHIL NFR BLD AUTO: 0.2 %
HCT VFR BLD CALC: 40.5 %
HGB BLD-MCNC: 13.2 G/DL
IMM GRANULOCYTES NFR BLD AUTO: 2.2 %
LYMPHOCYTES # BLD AUTO: 0.59 K/UL
LYMPHOCYTES NFR BLD AUTO: 5.4 %
MAN DIFF?: NORMAL
MCHC RBC-ENTMCNC: 30.1 PG
MCHC RBC-ENTMCNC: 32.6 G/DL
MCV RBC AUTO: 92.5 FL
MONOCYTES # BLD AUTO: 0.17 K/UL
MONOCYTES NFR BLD AUTO: 1.6 %
NEUTROPHILS # BLD AUTO: 9.86 K/UL
NEUTROPHILS NFR BLD AUTO: 89.9 %
PLATELET # BLD AUTO: 384 K/UL
RBC # BLD: 4.38 M/UL
RBC # FLD: 14.8 %
WBC # FLD AUTO: 10.96 K/UL

## 2024-12-26 LAB
25(OH)D3 SERPL-MCNC: 17.9 NG/ML
ALBUMIN SERPL ELPH-MCNC: 4.8 G/DL
ALP BLD-CCNC: 63 U/L
ALT SERPL-CCNC: 23 U/L
ANION GAP SERPL CALC-SCNC: 17 MMOL/L
AST SERPL-CCNC: 15 U/L
BILIRUB SERPL-MCNC: 1.1 MG/DL
BUN SERPL-MCNC: 17 MG/DL
CALCIUM SERPL-MCNC: 9.5 MG/DL
CHLORIDE SERPL-SCNC: 101 MMOL/L
CO2 SERPL-SCNC: 22 MMOL/L
CREAT SERPL-MCNC: 0.7 MG/DL
EGFR: 120 ML/MIN/1.73M2
GLUCOSE SERPL-MCNC: 127 MG/DL
HBV CORE IGG+IGM SER QL: NONREACTIVE
HBV CORE IGM SER QL: NONREACTIVE
POTASSIUM SERPL-SCNC: 4.5 MMOL/L
PROT SERPL-MCNC: 6.6 G/DL
SODIUM SERPL-SCNC: 139 MMOL/L
VZV AB TITR SER: POSITIVE
VZV IGG SER IF-ACNC: 2.52 S/CO

## 2024-12-29 LAB
JCV INDEX: 3.19
STRATIFY JCV ANTIBODY: POSITIVE

## 2024-12-31 ENCOUNTER — APPOINTMENT (OUTPATIENT)
Dept: RADIOLOGY | Facility: CLINIC | Age: 29
End: 2024-12-31
Payer: COMMERCIAL

## 2024-12-31 PROCEDURE — 71046 X-RAY EXAM CHEST 2 VIEWS: CPT

## 2025-01-21 ENCOUNTER — TRANSCRIPTION ENCOUNTER (OUTPATIENT)
Age: 30
End: 2025-01-21

## 2025-01-29 ENCOUNTER — APPOINTMENT (OUTPATIENT)
Dept: NEUROLOGY | Facility: CLINIC | Age: 30
End: 2025-01-29
Payer: COMMERCIAL

## 2025-01-29 ENCOUNTER — TRANSCRIPTION ENCOUNTER (OUTPATIENT)
Age: 30
End: 2025-01-29

## 2025-01-29 ENCOUNTER — NON-APPOINTMENT (OUTPATIENT)
Age: 30
End: 2025-01-29

## 2025-01-29 VITALS
HEART RATE: 92 BPM | OXYGEN SATURATION: 98 % | DIASTOLIC BLOOD PRESSURE: 86 MMHG | HEIGHT: 64 IN | BODY MASS INDEX: 31.07 KG/M2 | WEIGHT: 182 LBS | SYSTOLIC BLOOD PRESSURE: 123 MMHG

## 2025-01-29 PROCEDURE — G2211 COMPLEX E/M VISIT ADD ON: CPT | Mod: NC

## 2025-01-29 PROCEDURE — 99214 OFFICE O/P EST MOD 30 MIN: CPT

## 2025-01-29 RX ORDER — BRINZOLAMIDE/BRIMONIDINE TARTRATE 10; 2 MG/ML; MG/ML
1-0.2 SUSPENSION/ DROPS OPHTHALMIC
Refills: 0 | Status: ACTIVE | COMMUNITY

## 2025-01-29 RX ORDER — PREDNISONE 5 MG/1
5 TABLET ORAL
Qty: 140 | Refills: 0 | Status: ACTIVE | COMMUNITY
Start: 2025-01-29 | End: 1900-01-01

## 2025-02-03 ENCOUNTER — NON-APPOINTMENT (OUTPATIENT)
Age: 30
End: 2025-02-03

## 2025-02-03 ENCOUNTER — APPOINTMENT (OUTPATIENT)
Dept: OPHTHALMOLOGY | Facility: CLINIC | Age: 30
End: 2025-02-03

## 2025-02-03 PROCEDURE — 99214 OFFICE O/P EST MOD 30 MIN: CPT

## 2025-02-03 PROCEDURE — 92133 CPTRZD OPH DX IMG PST SGM ON: CPT

## 2025-02-03 PROCEDURE — 92083 EXTENDED VISUAL FIELD XM: CPT

## 2025-02-04 ENCOUNTER — TRANSCRIPTION ENCOUNTER (OUTPATIENT)
Age: 30
End: 2025-02-04

## 2025-02-13 ENCOUNTER — TRANSCRIPTION ENCOUNTER (OUTPATIENT)
Age: 30
End: 2025-02-13

## 2025-02-18 ENCOUNTER — TRANSCRIPTION ENCOUNTER (OUTPATIENT)
Age: 30
End: 2025-02-18

## 2025-04-09 ENCOUNTER — TRANSCRIPTION ENCOUNTER (OUTPATIENT)
Age: 30
End: 2025-04-09

## 2025-05-06 ENCOUNTER — NON-APPOINTMENT (OUTPATIENT)
Age: 30
End: 2025-05-06

## 2025-05-06 ENCOUNTER — APPOINTMENT (OUTPATIENT)
Dept: INTERNAL MEDICINE | Facility: CLINIC | Age: 30
End: 2025-05-06
Payer: COMMERCIAL

## 2025-05-06 VITALS
OXYGEN SATURATION: 99 % | RESPIRATION RATE: 16 BRPM | DIASTOLIC BLOOD PRESSURE: 70 MMHG | SYSTOLIC BLOOD PRESSURE: 107 MMHG | TEMPERATURE: 98.6 F | BODY MASS INDEX: 30.21 KG/M2 | WEIGHT: 176 LBS | HEART RATE: 82 BPM

## 2025-05-06 DIAGNOSIS — G36.0 NEUROMYELITIS OPTICA [DEVIC]: ICD-10-CM

## 2025-05-06 DIAGNOSIS — Z00.00 ENCOUNTER FOR GENERAL ADULT MEDICAL EXAMINATION W/OUT ABNORMAL FINDINGS: ICD-10-CM

## 2025-05-06 DIAGNOSIS — N63.20 UNSPECIFIED LUMP IN THE LEFT BREAST, UNSPECIFIED QUADRANT: ICD-10-CM

## 2025-05-06 DIAGNOSIS — R19.00 INTRA-ABDOMINAL AND PELVIC SWELLING, MASS AND LUMP, UNSPECIFIED SITE: ICD-10-CM

## 2025-05-06 DIAGNOSIS — E55.9 VITAMIN D DEFICIENCY, UNSPECIFIED: ICD-10-CM

## 2025-05-06 PROCEDURE — 99395 PREV VISIT EST AGE 18-39: CPT

## 2025-05-06 PROCEDURE — 36415 COLL VENOUS BLD VENIPUNCTURE: CPT

## 2025-05-07 ENCOUNTER — TRANSCRIPTION ENCOUNTER (OUTPATIENT)
Age: 30
End: 2025-05-07

## 2025-05-07 ENCOUNTER — LABORATORY RESULT (OUTPATIENT)
Age: 30
End: 2025-05-07

## 2025-05-07 LAB
25(OH)D3 SERPL-MCNC: 58.1 NG/ML
ALBUMIN SERPL ELPH-MCNC: 4.9 G/DL
ALP BLD-CCNC: 87 U/L
ALT SERPL-CCNC: 22 U/L
ANION GAP SERPL CALC-SCNC: 16 MMOL/L
APPEARANCE: CLEAR
AST SERPL-CCNC: 21 U/L
BASOPHILS # BLD AUTO: 0.03 K/UL
BASOPHILS NFR BLD AUTO: 0.5 %
BILIRUB SERPL-MCNC: 1.1 MG/DL
BILIRUBIN URINE: NEGATIVE
BLOOD URINE: NEGATIVE
BUN SERPL-MCNC: 12 MG/DL
CALCIUM SERPL-MCNC: 9.6 MG/DL
CHLORIDE SERPL-SCNC: 104 MMOL/L
CHOLEST SERPL-MCNC: 151 MG/DL
CO2 SERPL-SCNC: 23 MMOL/L
COLOR: YELLOW
CREAT SERPL-MCNC: 0.61 MG/DL
EGFRCR SERPLBLD CKD-EPI 2021: 123 ML/MIN/1.73M2
EOSINOPHIL # BLD AUTO: 0.08 K/UL
EOSINOPHIL NFR BLD AUTO: 1.3 %
ESTIMATED AVERAGE GLUCOSE: 111 MG/DL
FOLATE SERPL-MCNC: >20 NG/ML
GLUCOSE QUALITATIVE U: NEGATIVE MG/DL
GLUCOSE SERPL-MCNC: 81 MG/DL
HBA1C MFR BLD HPLC: 5.5 %
HCT VFR BLD CALC: 42.9 %
HDLC SERPL-MCNC: 44 MG/DL
HGB BLD-MCNC: 14.2 G/DL
IMM GRANULOCYTES NFR BLD AUTO: 0.5 %
KETONES URINE: NEGATIVE MG/DL
LDLC SERPL-MCNC: 92 MG/DL
LEUKOCYTE ESTERASE URINE: ABNORMAL
LYMPHOCYTES # BLD AUTO: 1.38 K/UL
LYMPHOCYTES NFR BLD AUTO: 22.8 %
MAN DIFF?: NORMAL
MCHC RBC-ENTMCNC: 29.1 PG
MCHC RBC-ENTMCNC: 33.1 G/DL
MCV RBC AUTO: 87.9 FL
MONOCYTES # BLD AUTO: 0.51 K/UL
MONOCYTES NFR BLD AUTO: 8.4 %
NEUTROPHILS # BLD AUTO: 4.03 K/UL
NEUTROPHILS NFR BLD AUTO: 66.5 %
NITRITE URINE: NEGATIVE
NONHDLC SERPL-MCNC: 108 MG/DL
PH URINE: 6.5
PLATELET # BLD AUTO: 313 K/UL
POTASSIUM SERPL-SCNC: 4 MMOL/L
PROT SERPL-MCNC: 7.5 G/DL
PROTEIN URINE: NEGATIVE MG/DL
RBC # BLD: 4.88 M/UL
RBC # FLD: 12.5 %
SODIUM SERPL-SCNC: 143 MMOL/L
SPECIFIC GRAVITY URINE: 1.01
TRIGL SERPL-MCNC: 84 MG/DL
TSH SERPL-ACNC: 1.14 UIU/ML
UROBILINOGEN URINE: 0.2 MG/DL
VIT B12 SERPL-MCNC: 618 PG/ML
WBC # FLD AUTO: 6.06 K/UL

## 2025-05-08 ENCOUNTER — TRANSCRIPTION ENCOUNTER (OUTPATIENT)
Age: 30
End: 2025-05-08

## 2025-05-12 ENCOUNTER — TRANSCRIPTION ENCOUNTER (OUTPATIENT)
Age: 30
End: 2025-05-12

## 2025-05-13 ENCOUNTER — APPOINTMENT (OUTPATIENT)
Dept: NEUROLOGY | Facility: CLINIC | Age: 30
End: 2025-05-13
Payer: COMMERCIAL

## 2025-05-13 VITALS
WEIGHT: 175 LBS | BODY MASS INDEX: 29.88 KG/M2 | HEIGHT: 64 IN | DIASTOLIC BLOOD PRESSURE: 73 MMHG | HEART RATE: 73 BPM | SYSTOLIC BLOOD PRESSURE: 111 MMHG

## 2025-05-13 PROCEDURE — G2211 COMPLEX E/M VISIT ADD ON: CPT | Mod: NC

## 2025-05-13 PROCEDURE — 99215 OFFICE O/P EST HI 40 MIN: CPT

## 2025-05-20 ENCOUNTER — TRANSCRIPTION ENCOUNTER (OUTPATIENT)
Age: 30
End: 2025-05-20

## 2025-05-20 ENCOUNTER — APPOINTMENT (OUTPATIENT)
Dept: ULTRASOUND IMAGING | Facility: CLINIC | Age: 30
End: 2025-05-20
Payer: COMMERCIAL

## 2025-05-20 ENCOUNTER — RESULT REVIEW (OUTPATIENT)
Age: 30
End: 2025-05-20

## 2025-05-20 ENCOUNTER — APPOINTMENT (OUTPATIENT)
Dept: MAMMOGRAPHY | Facility: CLINIC | Age: 30
End: 2025-05-20

## 2025-05-20 PROCEDURE — 76857 US EXAM PELVIC LIMITED: CPT

## 2025-05-20 PROCEDURE — 76641 ULTRASOUND BREAST COMPLETE: CPT | Mod: LT

## 2025-05-21 ENCOUNTER — APPOINTMENT (OUTPATIENT)
Dept: MRI IMAGING | Facility: CLINIC | Age: 30
End: 2025-05-21
Payer: COMMERCIAL

## 2025-05-21 PROCEDURE — A9585: CPT | Mod: JZ

## 2025-05-21 PROCEDURE — 72157 MRI CHEST SPINE W/O & W/DYE: CPT

## 2025-05-21 PROCEDURE — 72156 MRI NECK SPINE W/O & W/DYE: CPT

## 2025-05-23 LAB
DEPRECATED KAPPA LC FREE/LAMBDA SER: 1.02 RATIO
IGA SERPL-MCNC: 321 MG/DL
IGG SERPL-MCNC: 882 MG/DL
IGM SERPL-MCNC: 72 MG/DL
KAPPA LC CSF-MCNC: 1.21 MG/DL
KAPPA LC SERPL-MCNC: 1.23 MG/DL

## 2025-05-27 ENCOUNTER — TRANSCRIPTION ENCOUNTER (OUTPATIENT)
Age: 30
End: 2025-05-27

## 2025-06-06 ENCOUNTER — TRANSCRIPTION ENCOUNTER (OUTPATIENT)
Age: 30
End: 2025-06-06

## 2025-06-18 ENCOUNTER — NON-APPOINTMENT (OUTPATIENT)
Age: 30
End: 2025-06-18

## 2025-06-30 ENCOUNTER — TRANSCRIPTION ENCOUNTER (OUTPATIENT)
Age: 30
End: 2025-06-30

## 2025-06-30 ENCOUNTER — NON-APPOINTMENT (OUTPATIENT)
Age: 30
End: 2025-06-30

## 2025-07-03 ENCOUNTER — APPOINTMENT (OUTPATIENT)
Dept: OBGYN | Facility: CLINIC | Age: 30
End: 2025-07-03
Payer: COMMERCIAL

## 2025-07-03 VITALS
WEIGHT: 170 LBS | SYSTOLIC BLOOD PRESSURE: 127 MMHG | BODY MASS INDEX: 29.02 KG/M2 | DIASTOLIC BLOOD PRESSURE: 82 MMHG | HEIGHT: 64 IN

## 2025-07-03 PROBLEM — Z01.419 ENCOUNTER FOR GYNECOLOGICAL EXAMINATION: Status: ACTIVE | Noted: 2025-07-03

## 2025-07-03 PROBLEM — N63.0 BREAST NODULE: Status: ACTIVE | Noted: 2025-07-03

## 2025-07-03 PROCEDURE — 99395 PREV VISIT EST AGE 18-39: CPT

## 2025-07-03 PROCEDURE — 99459 PELVIC EXAMINATION: CPT

## 2025-07-07 LAB — HPV HIGH+LOW RISK DNA PNL CVX: NOT DETECTED

## 2025-07-09 LAB — CYTOLOGY CVX/VAG DOC THIN PREP: NORMAL

## 2025-07-10 ENCOUNTER — TRANSCRIPTION ENCOUNTER (OUTPATIENT)
Age: 30
End: 2025-07-10

## 2025-08-15 ENCOUNTER — NON-APPOINTMENT (OUTPATIENT)
Age: 30
End: 2025-08-15

## 2025-08-15 ENCOUNTER — APPOINTMENT (OUTPATIENT)
Dept: OPHTHALMOLOGY | Facility: CLINIC | Age: 30
End: 2025-08-15

## 2025-08-15 PROCEDURE — 92083 EXTENDED VISUAL FIELD XM: CPT

## 2025-08-15 PROCEDURE — 92133 CPTRZD OPH DX IMG PST SGM ON: CPT

## 2025-08-15 PROCEDURE — 99214 OFFICE O/P EST MOD 30 MIN: CPT

## 2025-08-26 ENCOUNTER — TRANSCRIPTION ENCOUNTER (OUTPATIENT)
Age: 30
End: 2025-08-26

## 2025-09-11 ENCOUNTER — TRANSCRIPTION ENCOUNTER (OUTPATIENT)
Age: 30
End: 2025-09-11